# Patient Record
Sex: FEMALE | Race: WHITE
[De-identification: names, ages, dates, MRNs, and addresses within clinical notes are randomized per-mention and may not be internally consistent; named-entity substitution may affect disease eponyms.]

---

## 2017-01-17 ENCOUNTER — RX ONLY (OUTPATIENT)
Age: 82
Setting detail: RX ONLY
End: 2017-01-17

## 2017-09-29 ENCOUNTER — HOSPITAL ENCOUNTER (OUTPATIENT)
Dept: LAB | Facility: MEDICAL CENTER | Age: 82
End: 2017-09-29
Attending: PHYSICIAN ASSISTANT
Payer: MEDICARE

## 2017-09-29 PROCEDURE — 87086 URINE CULTURE/COLONY COUNT: CPT

## 2017-10-02 LAB
BACTERIA UR CULT: NORMAL
SIGNIFICANT IND 70042: NORMAL
SITE SITE: NORMAL
SOURCE SOURCE: NORMAL

## 2017-10-20 ENCOUNTER — HOSPITAL ENCOUNTER (OUTPATIENT)
Dept: LAB | Facility: MEDICAL CENTER | Age: 82
End: 2017-10-20
Attending: PHYSICIAN ASSISTANT
Payer: MEDICARE

## 2017-10-20 PROCEDURE — 87086 URINE CULTURE/COLONY COUNT: CPT

## 2017-10-23 LAB
BACTERIA UR CULT: NORMAL
SIGNIFICANT IND 70042: NORMAL
SITE SITE: NORMAL
SOURCE SOURCE: NORMAL

## 2017-10-31 ENCOUNTER — APPOINTMENT (RX ONLY)
Dept: URBAN - METROPOLITAN AREA CLINIC 20 | Facility: CLINIC | Age: 82
Setting detail: DERMATOLOGY
End: 2017-10-31

## 2017-10-31 DIAGNOSIS — Z86.007 PERSONAL HISTORY OF IN-SITU NEOPLASM OF SKIN: ICD-10-CM

## 2017-10-31 DIAGNOSIS — R21 RASH AND OTHER NONSPECIFIC SKIN ERUPTION: ICD-10-CM

## 2017-10-31 PROBLEM — Z85.828 PERSONAL HISTORY OF OTHER MALIGNANT NEOPLASM OF SKIN: Status: ACTIVE | Noted: 2017-10-31

## 2017-10-31 PROBLEM — L57.0 ACTINIC KERATOSIS: Status: ACTIVE | Noted: 2017-10-31

## 2017-10-31 PROCEDURE — ? COUNSELING

## 2017-10-31 PROCEDURE — 99213 OFFICE O/P EST LOW 20 MIN: CPT

## 2017-10-31 ASSESSMENT — LOCATION ZONE DERM: LOCATION ZONE: TRUNK

## 2017-10-31 ASSESSMENT — LOCATION SIMPLE DESCRIPTION DERM: LOCATION SIMPLE: LOWER BACK

## 2017-10-31 ASSESSMENT — LOCATION DETAILED DESCRIPTION DERM: LOCATION DETAILED: INFERIOR LUMBAR SPINE

## 2017-10-31 NOTE — HPI: FOLLOW-UP
What Is The Condition That You Are Returning For Follow-Up?: basal cell carcinoma
During The Previous Visit, The Patient ....: Had C&D, rechecked last appointment
When Was Your Last Appointment?: 05/16/2017

## 2018-01-10 ENCOUNTER — APPOINTMENT (RX ONLY)
Dept: URBAN - METROPOLITAN AREA CLINIC 4 | Facility: CLINIC | Age: 83
Setting detail: DERMATOLOGY
End: 2018-01-10

## 2018-01-10 DIAGNOSIS — L82.1 OTHER SEBORRHEIC KERATOSIS: ICD-10-CM

## 2018-01-10 PROCEDURE — 99213 OFFICE O/P EST LOW 20 MIN: CPT

## 2018-01-10 PROCEDURE — ? COUNSELING

## 2018-01-10 ASSESSMENT — LOCATION SIMPLE DESCRIPTION DERM: LOCATION SIMPLE: RIGHT CHEEK

## 2018-01-10 ASSESSMENT — LOCATION ZONE DERM: LOCATION ZONE: FACE

## 2018-01-10 ASSESSMENT — LOCATION DETAILED DESCRIPTION DERM: LOCATION DETAILED: RIGHT SUPERIOR CENTRAL MALAR CHEEK

## 2019-04-10 ENCOUNTER — HOSPITAL ENCOUNTER (OUTPATIENT)
Dept: LAB | Facility: MEDICAL CENTER | Age: 84
End: 2019-04-10
Attending: UROLOGY
Payer: MEDICARE

## 2019-04-10 PROCEDURE — 87077 CULTURE AEROBIC IDENTIFY: CPT

## 2019-04-10 PROCEDURE — 87186 SC STD MICRODIL/AGAR DIL: CPT

## 2019-04-10 PROCEDURE — 87086 URINE CULTURE/COLONY COUNT: CPT

## 2019-04-14 LAB
BACTERIA UR CULT: ABNORMAL
BACTERIA UR CULT: ABNORMAL
SIGNIFICANT IND 70042: ABNORMAL
SITE SITE: ABNORMAL
SOURCE SOURCE: ABNORMAL

## 2019-04-27 ENCOUNTER — APPOINTMENT (OUTPATIENT)
Dept: RADIOLOGY | Facility: MEDICAL CENTER | Age: 84
End: 2019-04-27
Attending: EMERGENCY MEDICINE
Payer: MEDICARE

## 2019-04-27 ENCOUNTER — HOSPITAL ENCOUNTER (EMERGENCY)
Facility: MEDICAL CENTER | Age: 84
End: 2019-04-27
Attending: EMERGENCY MEDICINE
Payer: MEDICARE

## 2019-04-27 VITALS
WEIGHT: 110.01 LBS | BODY MASS INDEX: 19.49 KG/M2 | TEMPERATURE: 98.1 F | RESPIRATION RATE: 14 BRPM | OXYGEN SATURATION: 99 % | DIASTOLIC BLOOD PRESSURE: 77 MMHG | SYSTOLIC BLOOD PRESSURE: 138 MMHG | HEART RATE: 78 BPM

## 2019-04-27 DIAGNOSIS — S73.034A ANTERIOR DISLOCATION OF RIGHT HIP, INITIAL ENCOUNTER (HCC): ICD-10-CM

## 2019-04-27 PROCEDURE — 96376 TX/PRO/DX INJ SAME DRUG ADON: CPT

## 2019-04-27 PROCEDURE — 96375 TX/PRO/DX INJ NEW DRUG ADDON: CPT

## 2019-04-27 PROCEDURE — 96374 THER/PROPH/DIAG INJ IV PUSH: CPT

## 2019-04-27 PROCEDURE — 36415 COLL VENOUS BLD VENIPUNCTURE: CPT

## 2019-04-27 PROCEDURE — 700111 HCHG RX REV CODE 636 W/ 250 OVERRIDE (IP): Performed by: EMERGENCY MEDICINE

## 2019-04-27 PROCEDURE — 99152 MOD SED SAME PHYS/QHP 5/>YRS: CPT

## 2019-04-27 PROCEDURE — 27265 TREAT HIP DISLOCATION: CPT

## 2019-04-27 PROCEDURE — 99285 EMERGENCY DEPT VISIT HI MDM: CPT

## 2019-04-27 PROCEDURE — 73502 X-RAY EXAM HIP UNI 2-3 VIEWS: CPT | Mod: RT,76

## 2019-04-27 PROCEDURE — 73502 X-RAY EXAM HIP UNI 2-3 VIEWS: CPT | Mod: RT

## 2019-04-27 RX ORDER — ONDANSETRON 2 MG/ML
4 INJECTION INTRAMUSCULAR; INTRAVENOUS ONCE
Status: COMPLETED | OUTPATIENT
Start: 2019-04-27 | End: 2019-04-27

## 2019-04-27 RX ORDER — PROPOFOL 10 MG/ML
200 INJECTION, EMULSION INTRAVENOUS ONCE
Status: DISCONTINUED | OUTPATIENT
Start: 2019-04-27 | End: 2019-04-27

## 2019-04-27 RX ORDER — MORPHINE SULFATE 4 MG/ML
2 INJECTION, SOLUTION INTRAMUSCULAR; INTRAVENOUS ONCE
Status: COMPLETED | OUTPATIENT
Start: 2019-04-27 | End: 2019-04-27

## 2019-04-27 RX ADMIN — ONDANSETRON 4 MG: 2 INJECTION INTRAMUSCULAR; INTRAVENOUS at 09:37

## 2019-04-27 RX ADMIN — MORPHINE SULFATE 2 MG: 4 INJECTION INTRAVENOUS at 09:37

## 2019-04-27 RX ADMIN — ONDANSETRON 4 MG: 2 INJECTION INTRAMUSCULAR; INTRAVENOUS at 11:50

## 2019-04-27 RX ADMIN — PROPOFOL 40 MG: 10 INJECTION, EMULSION INTRAVENOUS at 11:00

## 2019-04-27 ASSESSMENT — LIFESTYLE VARIABLES: DO YOU DRINK ALCOHOL: NO

## 2019-04-27 NOTE — ED NOTES
Provided discharge instructions, Dc'd IV.  Pt and daughter awaiting transport.  Raina GAFFNEY aware.

## 2019-04-27 NOTE — DISCHARGE INSTRUCTIONS
Hip Dislocation  Introduction  Hip dislocation happens when the “ball” at the top of the thigh bone (femur) moves out of its normal position in the socket of the hip bone (pelvis). This happens because of severe force to the hip or the area that surrounds it. The hip may dislocate in a forward or backward direction. Backward dislocation (posterior dislocation) is most common.  Hip dislocation may involve a broken (fractured) bone. It can also involve damage to nerves, tissues that connect bones together (ligaments), or tissues that connect muscles to bones (tendons). Hip dislocation is an emergency that requires immediate medical treatment.  What are the causes?  This condition is often caused by a severe, direct hit (blow) to a bent knee. The blow forces the femur back against the hip joint. This type of injury is most commonly caused by a car crash. This injury may also be caused by:  · Falls, especially falls from a height.  · Contact sports.  · Industrial accidents.  What increases the risk?  The following factors may make you more likely to dislocate your hip:  · Having an artificial (prosthetic) hip.  · Being overweight or obese.  · Lacking hip strength and flexibility.  · Having a defect of the hip joint that is present at birth (congenital).  · Having an increased risk for falling.  · Participating in contact sports.  What are the signs or symptoms?  Symptoms of this condition may include:  · Severe pain in the hip area. Pain may get worse when you move or when you try to use your hip to support (bear) your weight.  · Inability to move the hip.  · Having the leg on the side of the dislocated hip appearing shorter than the other leg.  · Inward turning of the foot on the side of the dislocated hip.  · Loss of feeling in the lower leg, foot, or ankle.  How is this diagnosed?  This condition is diagnosed based on your medical history and a physical exam. You may have X-rays to confirm the diagnosis. You may be  examined by a health care provider who specializes in bone disorders (orthopedist).  How is this treated?  This condition is treated by moving the ball of your femur back into your hip socket (reduction). Your health care provider may perform a reduction by hand (manual reduction) or surgically.  Your health care provider may perform surgical reduction if:  · You have a fracture.  · You have bone fragments in your joint.  · You have damaged blood vessels or nerves.  · Manual reduction was not successful.  After your reduction, treatment may include:  · Crutches.  · A splint.  · Physical therapy to help strengthen the muscles that hold your hip joint in place.  Follow these instructions at home:  If you have a splint:  · Do not put pressure on any part of the splint until it is fully hardened. This may take several hours.  · Wear the splint as told by your health care provider. Remove it only as told by your health care provider.  · Loosen the splint if your toes tingle, become numb, or turn cold and blue.  · Do not let your splint get wet if it is not waterproof.  ¨ Do not take baths, swim, or use a hot tub until your health care provider approves. Ask your health care provider if you can take showers. You may only be allowed to take sponge baths for bathing.  ¨ If your splint is not waterproof, cover it with a watertight plastic bag when you take a bath or a shower.  · Keep the splint clean.  Managing pain, stiffness, and swelling  · If directed, put ice on the injured area:  ¨ Put ice in a plastic bag.  ¨ Place a towel between your skin and the bag.  ¨ Leave the ice on for 20 minutes, 2-3 times a day.  · Wear compression stockings or wraps as told by your health care provider.  · Move your toes often to avoid stiffness and to lessen swelling.  Driving  · Do not drive or operate heavy machinery while taking prescription pain medicine, or as told by your health care provider.  · Ask your health care provider when it  is safe to drive if you have a splint on your hip.  Activity  · Return to your normal activities as told by your health care provider. Ask your health care provider what activities are safe for you.  · If physical therapy was prescribed, do exercises as told by your health care provider.  Safety  · Do not use your hip to bear weight until your health care provider says that you can. Use crutches or a walker as told by your health care provider.  General instructions  · Do not any use tobacco products, such as cigarettes, chewing tobacco, and e-cigarettes. Tobacco can delay bone healing. If you need help quitting, ask your health care provider.  · Take over-the-counter and prescription medicines only as told by your health care provider.  · Keep all follow-up visits as told by your health care provider. This is important.  How is this prevented?  · If you have difficulty walking or keeping your balance, try using a cane or a walker for stability. If you feel unstable, sit down right away.  · Exercise regularly, as told by your health care provider.  · Warm up and stretch before being active.  · Cool down and stretch after being active.  Contact a health care provider if:  · You have pain that gets worse or does not get better with medicine.  · You have swelling or red skin on your hip area or your leg.  · You cannot move any part of your hip or leg.  · You have a tingling sensation in any part of your hip, leg, or foot.  Get help right away if:  · You feel like your hip has dislocated again.  · You have severe pain in your hip or groin.  · You have numbness or weakness in your leg.  If you have symptoms of a hip dislocation, do not wait to see if the symptoms will go away. Get medical help right away. Call your local emergency services (911 in the [place] Do not drive yourself to the hospital.   This information is not intended to replace advice given to you by your health care provider. Make sure you discuss any  questions you have with your health care provider.  Document Released: 09/12/2002 Document Revised: 05/25/2017 Document Reviewed: 07/19/2016  © 2017 [employer]

## 2019-04-27 NOTE — ED TRIAGE NOTES
BIB EMS for hip pain, unable to bear weight or ambulate.  Family denies fall or trauma.  Internal rotation R leg.  Denies pain, without hematoma.  CMS intact. Hx Bilateral hip replacements and dislocations, advanced dementia. Family denies blood thinners

## 2019-04-27 NOTE — ED NOTES
Pt laying in bed answering questions.  She is provided an additional warm blanket.  Continue to monitor

## 2019-04-27 NOTE — ED PROVIDER NOTES
ED Provider Note    Scribed for Caio Franco M.D. by Chela López. 4/27/2019, 9:08 AM.    Primary care provider: Tristan Roth M.D.  Means of arrival: Walk in  History obtained from: Patient's daughter   History limited by: None     CHIEF COMPLAINT  Hip pain    HPI  Svetlana Encinas is a 89 y.o. female with a past medical history significant for dementia, GERD, and osteoporosis who presents to the Emergency Department for evaluation of hip pain onset ` day ago. Per patient's caregiver, patient was being assisted over to the bathroom and noticed patient had trouble ambulating. Patient's daughter reports patient was unable to bear weight to ambulate complaining of hip pain this morning. Daughter also states that she noticed an obvious deformity to the patient's right leg that is internally rotated. She is unsure when this may have occurred. Patient has sustained hip fracture 6 months ago and underwent multiple hip replacement surgery previously. Daughter states that patient's normally able to ambulate with a walker and some assistance. No reports of a fall.     REVIEW OF SYSTEMS  Pertinent positives include right hip pain, difficulty with ambulation, deformity.   Pertinent negatives include no fall.     See HPI for further details. All other systems are reviewed and are negative.     PAST MEDICAL HISTORY   has a past medical history of Arthritis; GERD (gastroesophageal reflux disease); Heart burn; Indigestion; OSTEOPOROSIS; Pain; Unspecified cataract; and Urinary bladder disorder.    SURGICAL HISTORY   has a past surgical history that includes hernia repair; open reduction; other orthopedic surgery; cholecystectomy; cataract extraction with iol; carpal tunnel release (4/21/2014); athroplasty; and ptosis repair (10/22/2014).    SOCIAL HISTORY  Social History   Substance Use Topics   • Smoking status: Never Smoker   • Smokeless tobacco: Never Used   • Alcohol use No      History   Drug Use No        FAMILY HISTORY  History reviewed. No pertinent family history.    CURRENT MEDICATIONS  Home Medications     Reviewed by Lito Penn R.N. (Registered Nurse) on 04/27/19 at 0859  Med List Status: Not Addressed   Medication Last Dose Status   ALENDRONATE SODIUM PO  Active   ascorbic acid (ASCORBIC ACID) 500 MG TABS  Active   celecoxib (CELEBREX) 200 MG CAPS  Active   Diclofenac Sodium (VOLTAREN) 1 % GEL  Active   docusate sodium (DULCOLAX) 100 MG CAPS  Active   Multiple Vitamins-Minerals (CENTRUM SILVER ADULT 50+ PO)  Active   naproxen (ALEVE) 220 MG tablet  Active   omeprazole (PRILOSEC) 20 MG CPDR  Active   Psyllium (METAMUCIL) 0.52 GM CAPS  Active   VITAMIN D, CHOLECALCIFEROL, PO  Active                ALLERGIES  No Known Allergies    PHYSICAL EXAM  VITAL SIGNS: /77   Pulse 70   Temp 36.7 °C (98.1 °F) (Temporal)   Resp 14   Wt 49.9 kg (110 lb 0.2 oz)   SpO2 96%   BMI 19.49 kg/m²   Vitals reviewed.  Constitutional: Alert in no apparent distress.  HENT: No signs of trauma, Bilateral external ears normal, Nose normal.   Eyes: Pupils are equal and reactive, Conjunctiva normal, Non-icteric.   Neck: Normal range of motion, No tenderness, Supple, No stridor.   Lymphatic: No lymphadenopathy noted.   Cardiovascular: Regular rate and rhythm, no murmurs.   Thorax & Lungs: Normal breath sounds, No respiratory distress, No wheezing, No chest tenderness.   Abdomen: Bowel sounds normal, Soft, No tenderness, No peritoneal signs, No masses, No pulsatile masses.   Skin: Warm, Dry, No erythema, No rash.   Back: No bony tenderness, No CVA tenderness.   Extremities: Intact distal pulses, No edema, No tenderness, No cyanosis  Musculoskeletal: Hip is internally rotated on the right, no erythema, positive tenderness to palpation and pain with range of motion.  Normal 2+ pulses in bilateral lower extremities.   Neurologic: Alert, Normal motor function, Normal sensory function, No focal deficits noted.     DIAGNOSTIC  STUDIES / PROCEDURES      RADIOLOGY  DX-HIP-COMPLETE - UNILATERAL 2+ RIGHT   Final Result      1.  There has been interval reduction of the right hip arthroplasty dislocation.   2.  There is no definite acute displaced fracture.   3.  There is probably subacute or old injury involving the right inferior pubic ramus.      DX-HIP-COMPLETE - UNILATERAL 2+ RIGHT   Final Result      1.  There is a right superior lateral hip dislocation of the right hip arthroplasty.   2.  There is marked osteopenia but there are no displaced acute fractures identified.        The radiologist's interpretation of all radiological studies have been reviewed by me.    Conscious Sedation Procedure Note    Indication: hip dislocation    Consent: I have discussed with the patient and/or the patient representative the indication, alternatives, and the possible risks and/or complications of the planned procedure and the anesthesia methods. The patient and/or patient representative appear to understand and agree to proceed.    Physician Involvement: The attending physician was present and supervising this procedure.    Pre-Sedation Documentation and Exam: I have reviewed the patient's history and review of systems.    Airway Assessment: normal    Prior History of Anesthesia Complications: none    ASA Classification: Class 1 - A normal healthy patient    Sedation/ Anesthesia Plan: intravenous sedation    Medications Used: propofol intravenously    Monitoring and Safety: The patient was placed on a cardiac monitor and vital signs, pulse oximetry and level of consciousness were continuously evaluated throughout the procedure. The patient was closely monitored until recovery from the medications was complete and the patient had returned to baseline status. Respiratory therapy was on standby at all times during the procedure.    (The following sections must be completed)  Post-Sedation Vital Signs: Vital signs were reviewed and were stable after the  procedure (see flow sheet for vitals)            Post-Sedation Exam: Lungs: clear, heart regular rate and rhythm with no murmurs.           Complications: none    Joint Reduction Procedure Note    Indication: Joint dislocation    Consent: The patient and daughter were counseled regarding the procedure, it's indications, risks, potential complications and alternatives and any questions were answered. Consent was obtained.    Procedure: The pre-reduction exam showed distal perfusion & neurologic function to be normal. The patient was placed in the appropriate position. Anesthesia/pain control was obtained using conscious sedation -SEE CONSCIOUS SEDATION NOTE FOR DETAILS. Reduction of the right hip was performed by axial traction. Post reduction films were obtained and revealed satisfactory reduction. A post-reduction exam revealed distal perfusion & neurologic function to be normal. Patient has a walker and did not require additional ambulatory support.    The patient tolerated the procedure well.    Complications: None    COURSE & MEDICAL DECISION MAKING  Nursing notes, VS, PMSFHx reviewed in chart.    Differential diagnoses include but not limited to: Right hip fracture vs. Right hip dislocation      9:08 AM Patient seen and examined at bedside. Ordered for DX right hip to evaluate. Patient will be treated with 4 mg/ml morphine injection and 4 mg Zofran for her symptoms.    10:19 AM Reviewed patient's radiology results which showed dislocation of the right hip.     10:52 AM Patient reevaluated at bedside. She appears to be comfortably resting in bed. Discussed diagnostic results with patient and daughter as above, without evidence for fracture. Also discussed the risks of conscious sedation and joint reduction procedure. Patient's daughter understands and is agreeable.     11:06 AM Conscious sedation and joint reduction procedure performed at this time as outlined above. At least 15 minutes were spent at patient's  bedside at this time. Ordered DX right hip to confirm reduction. Patient was treated with additional 4 mg Zofran. Patient and daughter were advised to follow up with their Orthopedics surgeon, Dr. Parsons, for further evaluation. They understand and are comfortable to discharge home with instructions on supportive care.     DISPOSITION:  Patient will be discharged home in stable condition.    FOLLOW UP:  [facility], Emergency Dept  [address] [id number] [phone]    If symptoms worsen    Brad Garcia M.D.  [address]  [address]  [place] [id number] [phone]      As needed    Matt Parsons M.D.  [address]  [facility] [address]  [phone]      call for follow up    OUTPATIENT MEDICATIONS:  New Prescriptions    No medications on file     FINAL IMPRESSION  1. Anterior dislocation of right hip, initial encounter ([facility])    2.      Conscious sedation 16 minutes face-to-face performed by ERP  3.      Closed reduction of right hip dislocation performed by ERP      Chela HEWITT (Roc), am scribing for, and in the presence of, Caio Franco M.D..  Electronically signed by: Chela Whitt), 4/27/2019  Caio HEWITT M.D. personally performed the services described in this documentation, as scribed by Chela López in my presence, and it is both accurate and complete. C.     The note accurately reflects work and decisions made by me.  Caio Franco  4/27/2019  12:12 PM

## 2019-04-27 NOTE — ED NOTES
XRAY tech to room.  Pt taken to Xray with RN standby.  Pt is on 2L O2 to maintain sats >93%.  PT more awake and answering questions but easily falls back to sleep.

## 2019-04-27 NOTE — ED NOTES
Dr. Villarreal, Medical student, RT and RN in room.  Timeout, consent signed.  MD pushed propofol.  The then manipulated hip and it was back in place without incident.

## 2019-05-07 ENCOUNTER — HOSPITAL ENCOUNTER (EMERGENCY)
Facility: MEDICAL CENTER | Age: 84
End: 2019-05-07
Attending: EMERGENCY MEDICINE
Payer: MEDICARE

## 2019-05-07 ENCOUNTER — APPOINTMENT (OUTPATIENT)
Dept: RADIOLOGY | Facility: MEDICAL CENTER | Age: 84
End: 2019-05-07
Attending: EMERGENCY MEDICINE
Payer: MEDICARE

## 2019-05-07 VITALS
TEMPERATURE: 97.6 F | HEART RATE: 71 BPM | WEIGHT: 105 LBS | RESPIRATION RATE: 16 BRPM | OXYGEN SATURATION: 96 % | HEIGHT: 64 IN | BODY MASS INDEX: 17.93 KG/M2 | DIASTOLIC BLOOD PRESSURE: 71 MMHG | SYSTOLIC BLOOD PRESSURE: 145 MMHG

## 2019-05-07 DIAGNOSIS — N39.0 ACUTE UTI: ICD-10-CM

## 2019-05-07 DIAGNOSIS — S73.004A DISLOCATION OF RIGHT HIP, INITIAL ENCOUNTER (HCC): ICD-10-CM

## 2019-05-07 LAB
APPEARANCE UR: ABNORMAL
BACTERIA #/AREA URNS HPF: ABNORMAL /HPF
BILIRUB UR QL STRIP.AUTO: NEGATIVE
COLOR UR: YELLOW
EPI CELLS #/AREA URNS HPF: ABNORMAL /HPF
GLUCOSE UR STRIP.AUTO-MCNC: NEGATIVE MG/DL
KETONES UR STRIP.AUTO-MCNC: NEGATIVE MG/DL
LEUKOCYTE ESTERASE UR QL STRIP.AUTO: ABNORMAL
MICRO URNS: ABNORMAL
NITRITE UR QL STRIP.AUTO: POSITIVE
PH UR STRIP.AUTO: 7 [PH]
PROT UR QL STRIP: NEGATIVE MG/DL
RBC # URNS HPF: >150 /HPF
RBC UR QL AUTO: ABNORMAL
SP GR UR STRIP.AUTO: 1.01
WBC #/AREA URNS HPF: ABNORMAL /HPF

## 2019-05-07 PROCEDURE — 99285 EMERGENCY DEPT VISIT HI MDM: CPT

## 2019-05-07 PROCEDURE — 87186 SC STD MICRODIL/AGAR DIL: CPT

## 2019-05-07 PROCEDURE — 87086 URINE CULTURE/COLONY COUNT: CPT

## 2019-05-07 PROCEDURE — 81001 URINALYSIS AUTO W/SCOPE: CPT

## 2019-05-07 PROCEDURE — 72170 X-RAY EXAM OF PELVIS: CPT

## 2019-05-07 PROCEDURE — A9270 NON-COVERED ITEM OR SERVICE: HCPCS | Performed by: EMERGENCY MEDICINE

## 2019-05-07 PROCEDURE — 87077 CULTURE AEROBIC IDENTIFY: CPT

## 2019-05-07 PROCEDURE — 51701 INSERT BLADDER CATHETER: CPT

## 2019-05-07 PROCEDURE — 700102 HCHG RX REV CODE 250 W/ 637 OVERRIDE(OP): Performed by: EMERGENCY MEDICINE

## 2019-05-07 RX ORDER — CEFDINIR 300 MG/1
300 CAPSULE ORAL 2 TIMES DAILY
Qty: 20 CAP | Refills: 0 | Status: SHIPPED | OUTPATIENT
Start: 2019-05-07 | End: 2019-06-06

## 2019-05-07 RX ORDER — CEFDINIR 300 MG/1
300 CAPSULE ORAL ONCE
Status: COMPLETED | OUTPATIENT
Start: 2019-05-07 | End: 2019-05-07

## 2019-05-07 RX ADMIN — CEFDINIR 300 MG: 300 CAPSULE ORAL at 15:40

## 2019-05-07 NOTE — ED NOTES
Dc instructions reviewed with pt, son and care giver. Aware of need to f/u with pcp,  antibx at [facility] and take 2x daily, including 1 more today. To return for worsening s/s

## 2019-05-07 NOTE — ED NOTES
"dtr and care giver at bedside, assisting to amb with walker per home adl. Family states \"this is how she is at home\" and are comfortable with pt being d/c. erp aware of same  "

## 2019-05-07 NOTE — ED NOTES
Sons request no blood work or further interventions until their sister (pt daughter) arrives. erp aware of same-blood work NOT drawn pending same

## 2019-05-07 NOTE — DISCHARGE INSTRUCTIONS
Antibiotics as prescribed.  Return to the emergency room for any new medical problems or complaints.  See your doctor this week.

## 2019-05-07 NOTE — ED TRIAGE NOTES
Chief Complaint   Patient presents with   • Hip Injury     Bilateral hip replacements per ROB, today she is here for possible right hip dislocation. This is a chronic problem.   On ERP evaluation this hip was easily relocated. Image to be ordered. Also per MELANIESA, this pt had a ground level fall per caregiver, MELANIESA was summoned by daughter.

## 2019-05-07 NOTE — ED NOTES
Med rec updated and complete  Allergies reviewed  Pt is not sure what medications she is taking.  Called pts daughter (Evelyn) @ [phone] to verify all pts medications.  Pts daughter reports no prescription medications.  Pts daughter reports no antibiotics in the last 30 days.

## 2019-05-07 NOTE — ED NOTES
Female minicath x 2 insufficient sample per lab. Also attempted to stand pt with walker-pt unable to bear weight. erp and sons aware of same

## 2019-05-07 NOTE — ED PROVIDER NOTES
ED Provider Note    CHIEF COMPLAINT  Chief Complaint   Patient presents with   • Hip Injury     Bilateral hip replacements per [facility], today she is here for possible right hip dislocation. This is a chronic problem.       HPI  Svetlana Encinas is a 89 y.o. female who presents to the emergency department with right hip pain.  The patient has a history of recurrent right hip dislocations.  Patient is really unable to say what happened.  Unknown whether she has any falls or trauma she denies this.  No other complaints of pain at this time    REVIEW OF SYSTEMS  See HPI for further details. All other systems are negative.    PAST MEDICAL HISTORY  Past Medical History:   Diagnosis Date   • Arthritis     shoulders, elbow and wrist, hands   • GERD (gastroesophageal reflux disease)    • Heart burn    • Indigestion    • OSTEOPOROSIS    • Pain     hands   • Unspecified cataract     nikita IOL   • Urinary bladder disorder        FAMILY HISTORY  No family history on file.    SOCIAL HISTORY  Social History     Social History   • Marital status:      Spouse name: N/A   • Number of children: N/A   • Years of education: N/A     Social History Main Topics   • Smoking status: Never Smoker   • Smokeless tobacco: Never Used   • Alcohol use No   • Drug use: No   • Sexual activity: No     Other Topics Concern   • Not on file     Social History Narrative   • No narrative on file       SURGICAL HISTORY  Past Surgical History:   Procedure Laterality Date   • PTOSIS REPAIR  10/22/2014    Performed by Froylan Dunbar M.D. at SURGERY SURGICAL [facility] ORS   • CARPAL TUNNEL RELEASE  4/21/2014    Performed by Felix Huffman M.D. at SURGERY SAME [facility] ORS   • ATHROPLASTY     • CATARACT EXTRACTION WITH IOL      nikita eyes   • CHOLECYSTECTOMY     • HERNIA REPAIR     • OPEN REDUCTION     • OTHER ORTHOPEDIC SURGERY      multiple surgeries on hips       CURRENT MEDICATIONS  Home Medications    **Home medications have not yet been reviewed  "for this encounter**         ALLERGIES  No Known Allergies    PHYSICAL EXAM  VITAL SIGNS: /71   Pulse 72   Temp 36.4 °C (97.6 °F) (Temporal)   Resp 16   Ht 1.626 m (5' 4\")   Wt 47.6 kg (105 lb)   SpO2 96%   BMI 18.02 kg/m²      Constitutional: Awake alert nontoxic no acute distress  HENT: Normocephalic, Atraumatic, Bilateral external ears normal, Oropharynx moist, No oral exudates, Nose normal.   Eyes: PERRL, EOMI, Conjunctiva normal, No discharge.   Neck: Normal range of motion, No tenderness, Supple, No stridor.   Lymphatic: No lymphadenopathy noted.   Cardiovascular: Normal heart rate, Normal rhythm, No murmurs, No rubs, No gallops.   Thorax & Lungs: Normal breath sounds, No respiratory distress, No wheezing, no tenderness or signs of trauma.   Abdomen: Bowel sounds normal, Soft, No tenderness, no signs of trauma  Skin: Warm, Dry, No erythema, No rash.   Back: No tenderness, No CVA tenderness.  No abrasions or signs of trauma  Genitalia: No signs of rash.  Musculoskeletal: Extremities are unremarkable except for the right lower extremity which is clearly right hip dislocation she is shortening, and rotation.  Pain with movement.  Neurologic: Alert, at baseline per family.    RADIOLOGY/PROCEDURES  DX-PELVIS-1 OR 2 VIEWS   Final Result      1.  Bilateral hip arthroplasties. There is no evidence of dislocation of the arthroplasty components.      2.  Severe osteopenia.      3.  Old posttraumatic change of the right inferior pubic ramus.            COURSE & MEDICAL DECISION MAKING  Pertinent Labs & Imaging studies reviewed. (See chart for details)  The patient presents emergency department with right hip pain recurrent dislocation.  She is pretty comfortable and I first saw her.    Right hip is dislocated shortened and internally rotated and has pain with flexion of the hip.    She is noted to have significant atrophy that I suspect is a result of this the hip will reduce without sedation.      Insert " procedure note  Hip reduction procedure    Gentle traction applied distally on the foot longitudinally in the hip reduced within seconds.  This is tolerated well there is no pain.  Repeat neurovascular exam is normal      Initially the patient is here with family but not the family who is her power of  nor her caretaker.  They are really not able to provide much history.  Her hip is back and they state normally she walks is not help at home.  1 make sure she can get up and move around.  She does have some significant problems.  They asked me to wait for her daughter who is her power of  to perform any other care so I have done this.    In the interim I have reviewed the patient's chart, patient is a recent positive urine culture.  The family at bedside states that the patient should not be on antibiotics she does not have any symptoms and this is what the plan made by her primary care doctor.      Initially we should get a catheter site specimens were unable to do so.     The patient's caregiver finally showed up as well with the power of  and they are able to him later confirmed that she is at baseline.  They want me to check for her urine again.  And after doing so the request to call see her infectious disease.      I have collected a urinalysis and this will be sent for culture.  The patient's urinalysis is strongly positive.    Per the family's request I spoke with the physician on call for see her infectious disease, this is Dr. Roberts.    Have reviewed the cultures from April 10 and the results that he was restarted on Omnicef.    Family does not want any further work-up and wanted treated at home with oral antibiotic and follow-up with primary care think this is reasonable.  The patient is no signs of sepsis.  Per them she is at baseline but functionally, her mental status is baseline she is eating well she is drinking well and she is admitting at baseline and nothing else is  unusual.    I think she has pretty significant cystitis since this is gone untreated but no signs of sepsis we will treat her with oral Omnicef and close follow-up.    First dose of Omnicef was given here in the emergency department and she will follow-up with primary care and her infectious disease doctor this week.  Family is comfortable the plan she is discharged in good condition.    Brad Garcia M.D.  [id number] [facility] [address]  [facility] [address]  [phone]                FINAL IMPRESSION  1. Dislocation of right hip, initial encounter ([facility])    2. Acute UTI          3.         Electronically signed by: Fernando Garcia, 5/7/2019 10:33 AM

## 2019-05-07 NOTE — ED NOTES
Results noted by erp-per same, await return call from [facility] infectious disease. Update to family. Pt resting with eyes closed

## 2019-05-11 NOTE — ED NOTES
ED Positive Culture Follow-up/Notification Note:    Date: 5/11/19     Patient seen in the ED on 5/7/2019 for hip dislocation s/p fall.   1. Dislocation of right hip, initial encounter ([facility])    2. Acute UTI       Discharge Medication List as of 5/7/2019  3:43 PM      START taking these medications    Details   cefdinir (OMNICEF) 300 MG Cap Take 1 Cap by mouth 2 times a day., Disp-20 Cap, R-0, Normal             Allergies: Ciprofloxacin and Sulfamethoxazole-trimethoprim     Vitals:    05/07/19 1330 05/07/19 1430 05/07/19 1500 05/07/19 1542   BP:       Pulse: 68 68 68 71   Resp:       Temp:       TempSrc:       SpO2: 95% 99% 100% 96%   Weight:       Height:           Final cultures:   Results     Procedure Component Value Units Date/Time    URINE CULTURE(NEW) [[id number]]  (Abnormal)  (Susceptibility) Collected:  05/07/19 1423    Order Status:  Completed Specimen:  Urine Updated:  05/10/19 0923     Significant Indicator POS (POS)     Source UR     Site -     Culture Result - (A)      Klebsiella pneumoniae  >100,000 cfu/mL   (A)    Culture & Susceptibility     KLEBSIELLA PNEUMONIAE     Antibiotic Sensitivity Microscan Unit Status    Ampicillin Resistant >16 mcg/mL Final    Method: MARYBEL    Cefepime Sensitive <=8 mcg/mL Final    Method: MARYBEL    Cefotaxime Sensitive <=2 mcg/mL Final    Method: MARYBEL    Cefotetan Sensitive <=16 mcg/mL Final    Method: MARYBEL    Ceftazidime Sensitive <=1 mcg/mL Final    Method: MARYBEL    Ceftriaxone Sensitive <=8 mcg/mL Final    Method: MARYBEL    Cefuroxime Sensitive <=4 mcg/mL Final    Method: MARYBEL    Cephalothin Sensitive <=8 mcg/mL Final    Method: MARYBEL    Ciprofloxacin Sensitive <=1 mcg/mL Final    Method: MARYBEL    Gentamicin Sensitive <=4 mcg/mL Final    Method: MARYBEL    Levofloxacin Sensitive <=2 mcg/mL Final    Method: MARYBEL    Nitrofurantoin Intermediate 64 mcg/mL Final    Method: MARYBEL    Pip/Tazobactam Sensitive <=16 mcg/mL Final    Method: MARYBEL    Piperacillin Sensitive <=16 mcg/mL Final    Method: MARYBEL     Tigecycline Sensitive <=2 mcg/mL Final    Method: MARYBEL    Tobramycin Sensitive <=4 mcg/mL Final    Method: MARYBEL    Trimeth/Sulfa Sensitive <=2/38 mcg/mL Final    Method: MARYBEL                       URINALYSIS [[id number]]  (Abnormal) Collected:  05/07/19 1423    Order Status:  Completed Updated:  05/07/19 1448     Color Yellow     Character Cloudy (A)     Specific Gravity 1.010     Ph 7.0     Glucose Negative mg/dL      Ketones Negative mg/dL      Protein Negative mg/dL      Bilirubin Negative     Nitrite Positive (A)     Leukocyte Esterase Large (A)     Occult Blood Large (A)     Micro Urine Req Microscopic    URINALYSIS CULTURE, IF INDICATED [[id number]] Collected:  05/07/19 1423    Order Status:  Canceled Specimen:  Urine Updated:  05/07/19 1425    URINALYSIS,CULTURE IF INDICATED [[id number]] Collected:  05/07/19 1216    Order Status:  Canceled Specimen:  Urine Updated:  05/07/19 1224    URINE CULTURE(NEW) [[id number]] Collected:  05/07/19 1216    Order Status:  Canceled Specimen:  Other from Urine, Straight Cath     URINE CULTURE(NEW) [[id number]]     Order Status:  Canceled           Plan:   Mini-cath inserted x2 in order to obtain adequate urine volume.  Culture is positive for Klebsiella pneumoniae - the organism is sensitive to the prescribed cefdinir.  KATIE consulted in the ED.  No further ED staff follow up required with respect to urine culture results. Will defer further management to KATIE.     Mary Pat

## 2019-05-13 ENCOUNTER — APPOINTMENT (OUTPATIENT)
Dept: RADIOLOGY | Facility: MEDICAL CENTER | Age: 84
End: 2019-05-13
Attending: EMERGENCY MEDICINE
Payer: MEDICARE

## 2019-05-13 ENCOUNTER — HOSPITAL ENCOUNTER (EMERGENCY)
Facility: MEDICAL CENTER | Age: 84
End: 2019-05-13
Attending: EMERGENCY MEDICINE
Payer: MEDICARE

## 2019-05-13 VITALS
WEIGHT: 126 LBS | BODY MASS INDEX: 21.51 KG/M2 | OXYGEN SATURATION: 95 % | TEMPERATURE: 97.6 F | HEART RATE: 67 BPM | DIASTOLIC BLOOD PRESSURE: 74 MMHG | HEIGHT: 64 IN | RESPIRATION RATE: 18 BRPM | SYSTOLIC BLOOD PRESSURE: 141 MMHG

## 2019-05-13 DIAGNOSIS — S73.004A CLOSED DISLOCATION OF RIGHT HIP, INITIAL ENCOUNTER (HCC): ICD-10-CM

## 2019-05-13 LAB
ALBUMIN SERPL BCP-MCNC: 3.3 G/DL (ref 3.2–4.9)
ALBUMIN/GLOB SERPL: 1 G/DL
ALP SERPL-CCNC: 77 U/L (ref 30–99)
ALT SERPL-CCNC: 12 U/L (ref 2–50)
ANION GAP SERPL CALC-SCNC: 10 MMOL/L (ref 0–11.9)
APTT PPP: 31.8 SEC (ref 24.7–36)
AST SERPL-CCNC: 21 U/L (ref 12–45)
BASOPHILS # BLD AUTO: 0.4 % (ref 0–1.8)
BASOPHILS # BLD: 0.02 K/UL (ref 0–0.12)
BILIRUB SERPL-MCNC: 0.7 MG/DL (ref 0.1–1.5)
BUN SERPL-MCNC: 15 MG/DL (ref 8–22)
CALCIUM SERPL-MCNC: 8.6 MG/DL (ref 8.4–10.2)
CHLORIDE SERPL-SCNC: 94 MMOL/L (ref 96–112)
CO2 SERPL-SCNC: 22 MMOL/L (ref 20–33)
CREAT SERPL-MCNC: 0.65 MG/DL (ref 0.5–1.4)
EOSINOPHIL # BLD AUTO: 0.05 K/UL (ref 0–0.51)
EOSINOPHIL NFR BLD: 1 % (ref 0–6.9)
ERYTHROCYTE [DISTWIDTH] IN BLOOD BY AUTOMATED COUNT: 52 FL (ref 35.9–50)
GLOBULIN SER CALC-MCNC: 3.4 G/DL (ref 1.9–3.5)
GLUCOSE SERPL-MCNC: 87 MG/DL (ref 65–99)
HCT VFR BLD AUTO: 30.5 % (ref 37–47)
HGB BLD-MCNC: 9.9 G/DL (ref 12–16)
IMM GRANULOCYTES # BLD AUTO: 0.01 K/UL (ref 0–0.11)
IMM GRANULOCYTES NFR BLD AUTO: 0.2 % (ref 0–0.9)
INR PPP: 1.07 (ref 0.87–1.13)
LYMPHOCYTES # BLD AUTO: 1.13 K/UL (ref 1–4.8)
LYMPHOCYTES NFR BLD: 23.5 % (ref 22–41)
MCH RBC QN AUTO: 28 PG (ref 27–33)
MCHC RBC AUTO-ENTMCNC: 32.5 G/DL (ref 33.6–35)
MCV RBC AUTO: 86.4 FL (ref 81.4–97.8)
MONOCYTES # BLD AUTO: 0.47 K/UL (ref 0–0.85)
MONOCYTES NFR BLD AUTO: 9.8 % (ref 0–13.4)
NEUTROPHILS # BLD AUTO: 3.12 K/UL (ref 2–7.15)
NEUTROPHILS NFR BLD: 65.1 % (ref 44–72)
NRBC # BLD AUTO: 0 K/UL
NRBC BLD-RTO: 0 /100 WBC
PLATELET # BLD AUTO: 312 K/UL (ref 164–446)
PMV BLD AUTO: 9.2 FL (ref 9–12.9)
POTASSIUM SERPL-SCNC: 3.9 MMOL/L (ref 3.6–5.5)
PROT SERPL-MCNC: 6.7 G/DL (ref 6–8.2)
PROTHROMBIN TIME: 13.8 SEC (ref 12–14.6)
RBC # BLD AUTO: 3.53 M/UL (ref 4.2–5.4)
SODIUM SERPL-SCNC: 126 MMOL/L (ref 135–145)
WBC # BLD AUTO: 4.8 K/UL (ref 4.8–10.8)

## 2019-05-13 PROCEDURE — 700111 HCHG RX REV CODE 636 W/ 250 OVERRIDE (IP)

## 2019-05-13 PROCEDURE — 73502 X-RAY EXAM HIP UNI 2-3 VIEWS: CPT | Mod: RT

## 2019-05-13 PROCEDURE — 85610 PROTHROMBIN TIME: CPT

## 2019-05-13 PROCEDURE — 96374 THER/PROPH/DIAG INJ IV PUSH: CPT

## 2019-05-13 PROCEDURE — 700105 HCHG RX REV CODE 258: Performed by: EMERGENCY MEDICINE

## 2019-05-13 PROCEDURE — 85730 THROMBOPLASTIN TIME PARTIAL: CPT

## 2019-05-13 PROCEDURE — 99285 EMERGENCY DEPT VISIT HI MDM: CPT

## 2019-05-13 PROCEDURE — 80053 COMPREHEN METABOLIC PANEL: CPT

## 2019-05-13 PROCEDURE — 94760 N-INVAS EAR/PLS OXIMETRY 1: CPT

## 2019-05-13 PROCEDURE — 27265 TREAT HIP DISLOCATION: CPT

## 2019-05-13 PROCEDURE — 85025 COMPLETE CBC W/AUTO DIFF WBC: CPT

## 2019-05-13 RX ORDER — SODIUM CHLORIDE 9 MG/ML
INJECTION, SOLUTION INTRAVENOUS CONTINUOUS
Status: DISCONTINUED | OUTPATIENT
Start: 2019-05-13 | End: 2019-05-13 | Stop reason: HOSPADM

## 2019-05-13 RX ADMIN — SODIUM CHLORIDE: 9 INJECTION, SOLUTION INTRAVENOUS at 13:05

## 2019-05-13 RX ADMIN — PROPOFOL 40 MG: 10 INJECTION, EMULSION INTRAVENOUS at 13:25

## 2019-05-13 ASSESSMENT — PAIN DESCRIPTION - DESCRIPTORS: DESCRIPTORS: ACHING

## 2019-05-13 NOTE — ED NOTES
Pt assessed. R hip shortened and rotated, pt has hx of dislocation. Family at BS. CMS intact. Pt has no pain in bed. Will cont to monitor

## 2019-05-13 NOTE — ED PROVIDER NOTES
"ED Provider Note    CHIEF COMPLAINT  Chief Complaint   Patient presents with   • Dislocation Hip       HPI  Svetlana Encinas is a 89 y.o. female who presents evaluation of hip pain.  Patient has a history of recurrent hip dislocations.  Patient does not recall what happened but has been complaining of right hip pain.  The patient was brought in by EMS from a nursing home for evaluation.  The patient denies: Head pain, neck pain, chest pain, shortness of breath, fever, chills, abdominal pain, vomiting, diarrhea, back pain, other extremity pain.  No other acute symptomatology or complaints.    REVIEW OF SYSTEMS  See HPI for further details. All other systems negative.    PAST MEDICAL HISTORY  Past Medical History:   Diagnosis Date   • Arthritis     shoulders, elbow and wrist, hands   • GERD (gastroesophageal reflux disease)    • Heart burn    • Indigestion    • OSTEOPOROSIS    • Pain     hands   • Unspecified cataract     nikita IOL   • Urinary bladder disorder        FAMILY HISTORY  No family history on file.    SOCIAL HISTORY  Resides locally    SURGICAL HISTORY  Past Surgical History:   Procedure Laterality Date   • PTOSIS REPAIR  10/22/2014    Performed by Froylan Dunbar M.D. at SURGERY SURGICAL [facility] ORS   • CARPAL TUNNEL RELEASE  4/21/2014    Performed by Felix Huffman M.D. at SURGERY SAME DAY [facility] ORS   • ATHROPLASTY     • CATARACT EXTRACTION WITH IOL      nikita eyes   • CHOLECYSTECTOMY     • HERNIA REPAIR     • OPEN REDUCTION     • OTHER ORTHOPEDIC SURGERY      multiple surgeries on hips       CURRENT MEDICATIONS  See nurse's notes    ALLERGIES  Allergies   Allergen Reactions   • Ciprofloxacin Rash     Pts daughter (Evelyn) reports rash all over body.   • Sulfamethoxazole-Trimethoprim Rash     Pts daughter (Evelyn) reports rash all over body.       PHYSICAL EXAM  VITAL SIGNS: /67   Pulse 65   Temp 36.4 °C (97.6 °F)   Resp 20   Ht 1.626 m (5' 4\")   Wt 57.2 kg (126 lb)   BMI 21.63 kg/m²  "   Constitutional: 89-year-old female, slow to answer questions, oriented x1  HENT: Normocephalic, Atraumatic, Nares:Clear, Oropharynx: moist, well hydrated, posterior pharynx:clear   Eyes: PERRL, EOMI, Conjunctiva normal, No discharge.   Neck: Normal range of motion, No tenderness, Supple, No stridor.   Lymphatic: No lymphadenopathy noted.   Cardiovascular: Regular rate and rhythm without mumurs, gallups, rubs   Thorax & Lungs: Normal Equal breath sounds, No respiratory distress, No wheezing, no stridor, no rales. No chest tenderness.   Abdomen: Soft, nontender, nondistended, no organomegaly, positive bowel sounds normal in quality. No guarding or rebound.  Skin: Decreased skin turgor, pink, warm, dry. No rashes, petechiae, purpura. Normal capillary refill.   Back: No tenderness, No CVA tenderness.   Extremities: Intact distal pulses, No edema, No tenderness, No cyanosis,  Vascular: Pulses are 2+, symmetric in the upper and lower extremities.  Musculoskeletal: These arthritic changes throughout identified; no trauma to the upper extremities or left lower extremity; right lower extremity reveals shortening and external rotation; motor, sensory, vascular intact distally;  Neurologic: Weak appearing, oriented x 1,  No gross focal deficits noted.       RADIOLOGY/PROCEDURES  DX-HIP-COMPLETE - UNILATERAL 2+ RIGHT   Final Result         Interval reduction of the right hip prosthesis.      DX-HIP-COMPLETE - UNILATERAL 2+ RIGHT   Final Result         Superior dislocation of the right hip prosthesis.      Diffuse severe osseous demineralization.            COURSE & MEDICAL DECISION MAKING  Pertinent Labs & Imaging studies reviewed. (See chart for details)  1.  Monitor  2.  IV normal saline; IV fluids administered for clinical dehydration; unable to give oral fluids as patient requires n.p.o. Status; reevaluation revealed stable status;    Laboratory studies: CBC shows white count 4.8, 65% neutrophils, 23% lymphocytes, 9%  monocytes, hemoglobin 9.9 hematocrit 30.5; coags within normal; CMP shows sodium 126 chloride 94 otherwise within normal;    Moderate sedation note/procedure note: A pre-procedural physical examination was performed.  The patient was on continuous EKG and pulse oximetry monitoring.  Patient was medicated with propofol 40 mg IV.  Moderate sedation was obtained.  I then performed traction with countertraction was able to reduce the hip dislocation.  Patient was observed and had no adverse effects from the moderate sedation of the procedure.  The patient meets criteria for discharge for moderate sedation.    Discussion: At this time, the patient presents for evaluation of right hip pain.  Patient had hip dislocation which was treated with moderate sedation and closed reduction.  Patient has no deterioration in status.  Patient is stable for discharge.    FINAL IMPRESSION  1. Closed dislocation of right hip, initial encounter ([facility])    2.      Closed reduction of hip dislocation  3.      Moderate sedation       PLAN  1.  Appropriate discharge instructions given  2.  Follow-up with primary care  3.  Follow-up with orthopedic surgery    Electronically signed by: Guy G Gansert, 5/13/2019 11:18 AM

## 2019-05-13 NOTE — ED NOTES
Pt and pts son are not sure what medications she is taking.  Called Evelyn (Daughter) @ [phone], left message to call back to verify medications.

## 2019-05-13 NOTE — ED NOTES
Pt incontinent of urine, pt cleaned and placed in fresh depends, Pt discharged to son and caregiver, reviewed all discharge instructions including follow up, pt verbalizes understanding, and denies questions.   Escorted to lobby. No belongings left in room.

## 2019-05-13 NOTE — FLOWSHEET NOTE
05/13/19 4565   Events/Summary/Plan   Events/Summary/Plan Conscious sedation stand by.  Post procedure.   Chest Exam   Pulse 63   Heart Rate (Monitored) 62   Oximetry   #Pulse Oximetry (Single Determination) Yes   Oxygen   Pulse Oximetry 99 %   O2 (LPM) 1.5   O2 Daily Delivery Respiratory  Silicone Nasal Cannula

## 2019-05-13 NOTE — ED NOTES
1323: Consent on chart, RT at BS, pt on all monitors for sedation  1325: ERP at BS, sedation start. Time out performed  1325: 40 mg Propofol given IV push  1326: Pt asleep, R hip reduced, pt tolerated well  1328: End sedation, pt remains asleep, hip no longer shortened.     1335: Family at BS  1340: Pt opening eyes, remains drowsy. Pt on room air

## 2019-06-02 ENCOUNTER — APPOINTMENT (OUTPATIENT)
Dept: RADIOLOGY | Facility: MEDICAL CENTER | Age: 84
End: 2019-06-02
Attending: ORTHOPAEDIC SURGERY
Payer: MEDICARE

## 2019-06-02 ENCOUNTER — ANESTHESIA (OUTPATIENT)
Dept: SURGERY | Facility: MEDICAL CENTER | Age: 84
End: 2019-06-02
Payer: MEDICARE

## 2019-06-02 ENCOUNTER — ANESTHESIA EVENT (OUTPATIENT)
Dept: SURGERY | Facility: MEDICAL CENTER | Age: 84
End: 2019-06-02
Payer: MEDICARE

## 2019-06-02 ENCOUNTER — HOSPITAL ENCOUNTER (OUTPATIENT)
Facility: MEDICAL CENTER | Age: 84
End: 2019-06-02
Attending: ORTHOPAEDIC SURGERY | Admitting: ORTHOPAEDIC SURGERY
Payer: MEDICARE

## 2019-06-02 VITALS
DIASTOLIC BLOOD PRESSURE: 71 MMHG | BODY MASS INDEX: 19.4 KG/M2 | RESPIRATION RATE: 18 BRPM | TEMPERATURE: 99.2 F | OXYGEN SATURATION: 96 % | SYSTOLIC BLOOD PRESSURE: 139 MMHG | WEIGHT: 102.73 LBS | HEART RATE: 89 BPM | HEIGHT: 61 IN

## 2019-06-02 PROCEDURE — 73501 X-RAY EXAM HIP UNI 1 VIEW: CPT | Mod: RT

## 2019-06-02 PROCEDURE — 160047 HCHG PACU  - EA ADDL 30 MINS PHASE II: Performed by: ORTHOPAEDIC SURGERY

## 2019-06-02 PROCEDURE — 160048 HCHG OR STATISTICAL LEVEL 1-5: Performed by: ORTHOPAEDIC SURGERY

## 2019-06-02 PROCEDURE — 160026 HCHG SURGERY MINUTES - 1ST 30 MINS LEVEL 1: Performed by: ORTHOPAEDIC SURGERY

## 2019-06-02 PROCEDURE — 700101 HCHG RX REV CODE 250: Performed by: ANESTHESIOLOGY

## 2019-06-02 PROCEDURE — 700105 HCHG RX REV CODE 258: Performed by: ANESTHESIOLOGY

## 2019-06-02 PROCEDURE — 160009 HCHG ANES TIME/MIN: Performed by: ORTHOPAEDIC SURGERY

## 2019-06-02 PROCEDURE — 700111 HCHG RX REV CODE 636 W/ 250 OVERRIDE (IP): Performed by: ANESTHESIOLOGY

## 2019-06-02 PROCEDURE — 160035 HCHG PACU - 1ST 60 MINS PHASE I: Performed by: ORTHOPAEDIC SURGERY

## 2019-06-02 PROCEDURE — 160036 HCHG PACU - EA ADDL 30 MINS PHASE I: Performed by: ORTHOPAEDIC SURGERY

## 2019-06-02 PROCEDURE — 160025 RECOVERY II MINUTES (STATS): Performed by: ORTHOPAEDIC SURGERY

## 2019-06-02 PROCEDURE — 160002 HCHG RECOVERY MINUTES (STAT): Performed by: ORTHOPAEDIC SURGERY

## 2019-06-02 PROCEDURE — 160046 HCHG PACU - 1ST 60 MINS PHASE II: Performed by: ORTHOPAEDIC SURGERY

## 2019-06-02 PROCEDURE — 160037 HCHG SURGERY MINUTES - EA ADDL 1 MIN LEVEL 1: Performed by: ORTHOPAEDIC SURGERY

## 2019-06-02 RX ORDER — ONDANSETRON 2 MG/ML
INJECTION INTRAMUSCULAR; INTRAVENOUS PRN
Status: DISCONTINUED | OUTPATIENT
Start: 2019-06-02 | End: 2019-06-02 | Stop reason: SURG

## 2019-06-02 RX ORDER — HYDROMORPHONE HYDROCHLORIDE 1 MG/ML
0.1 INJECTION, SOLUTION INTRAMUSCULAR; INTRAVENOUS; SUBCUTANEOUS
Status: DISCONTINUED | OUTPATIENT
Start: 2019-06-02 | End: 2019-06-02 | Stop reason: HOSPADM

## 2019-06-02 RX ORDER — HALOPERIDOL 5 MG/ML
1 INJECTION INTRAMUSCULAR
Status: DISCONTINUED | OUTPATIENT
Start: 2019-06-02 | End: 2019-06-02 | Stop reason: HOSPADM

## 2019-06-02 RX ORDER — HYDROMORPHONE HYDROCHLORIDE 1 MG/ML
0.4 INJECTION, SOLUTION INTRAMUSCULAR; INTRAVENOUS; SUBCUTANEOUS
Status: DISCONTINUED | OUTPATIENT
Start: 2019-06-02 | End: 2019-06-02 | Stop reason: HOSPADM

## 2019-06-02 RX ORDER — LABETALOL HYDROCHLORIDE 5 MG/ML
5 INJECTION, SOLUTION INTRAVENOUS
Status: DISCONTINUED | OUTPATIENT
Start: 2019-06-02 | End: 2019-06-02 | Stop reason: HOSPADM

## 2019-06-02 RX ORDER — MIDAZOLAM HYDROCHLORIDE 1 MG/ML
1 INJECTION INTRAMUSCULAR; INTRAVENOUS
Status: DISCONTINUED | OUTPATIENT
Start: 2019-06-02 | End: 2019-06-02 | Stop reason: HOSPADM

## 2019-06-02 RX ORDER — SODIUM CHLORIDE, SODIUM LACTATE, POTASSIUM CHLORIDE, CALCIUM CHLORIDE 600; 310; 30; 20 MG/100ML; MG/100ML; MG/100ML; MG/100ML
INJECTION, SOLUTION INTRAVENOUS
Status: DISCONTINUED | OUTPATIENT
Start: 2019-06-02 | End: 2019-06-02 | Stop reason: SURG

## 2019-06-02 RX ORDER — MEPERIDINE HYDROCHLORIDE 25 MG/ML
12.5 INJECTION INTRAMUSCULAR; INTRAVENOUS; SUBCUTANEOUS
Status: DISCONTINUED | OUTPATIENT
Start: 2019-06-02 | End: 2019-06-02 | Stop reason: HOSPADM

## 2019-06-02 RX ORDER — HYDRALAZINE HYDROCHLORIDE 20 MG/ML
5 INJECTION INTRAMUSCULAR; INTRAVENOUS
Status: DISCONTINUED | OUTPATIENT
Start: 2019-06-02 | End: 2019-06-02 | Stop reason: HOSPADM

## 2019-06-02 RX ORDER — HYDROMORPHONE HYDROCHLORIDE 1 MG/ML
0.2 INJECTION, SOLUTION INTRAMUSCULAR; INTRAVENOUS; SUBCUTANEOUS
Status: DISCONTINUED | OUTPATIENT
Start: 2019-06-02 | End: 2019-06-02 | Stop reason: HOSPADM

## 2019-06-02 RX ORDER — SODIUM CHLORIDE, SODIUM LACTATE, POTASSIUM CHLORIDE, CALCIUM CHLORIDE 600; 310; 30; 20 MG/100ML; MG/100ML; MG/100ML; MG/100ML
INJECTION, SOLUTION INTRAVENOUS CONTINUOUS
Status: DISCONTINUED | OUTPATIENT
Start: 2019-06-02 | End: 2019-06-02 | Stop reason: HOSPADM

## 2019-06-02 RX ORDER — ONDANSETRON 2 MG/ML
4 INJECTION INTRAMUSCULAR; INTRAVENOUS
Status: DISCONTINUED | OUTPATIENT
Start: 2019-06-02 | End: 2019-06-02 | Stop reason: HOSPADM

## 2019-06-02 RX ORDER — TRAMADOL HYDROCHLORIDE 50 MG/1
50 TABLET ORAL EVERY 6 HOURS PRN
COMMUNITY

## 2019-06-02 RX ADMIN — SODIUM CHLORIDE, POTASSIUM CHLORIDE, SODIUM LACTATE AND CALCIUM CHLORIDE: 600; 310; 30; 20 INJECTION, SOLUTION INTRAVENOUS at 10:29

## 2019-06-02 RX ADMIN — ROCURONIUM BROMIDE 25 MG: 10 INJECTION, SOLUTION INTRAVENOUS at 10:32

## 2019-06-02 RX ADMIN — ONDANSETRON 4 MG: 2 INJECTION INTRAMUSCULAR; INTRAVENOUS at 10:49

## 2019-06-02 RX ADMIN — FENTANYL CITRATE 25 MCG: 50 INJECTION, SOLUTION INTRAMUSCULAR; INTRAVENOUS at 10:32

## 2019-06-02 RX ADMIN — SUGAMMADEX 200 MG: 100 INJECTION, SOLUTION INTRAVENOUS at 10:49

## 2019-06-02 RX ADMIN — EPHEDRINE SULFATE 10 MG: 50 INJECTION INTRAMUSCULAR; INTRAVENOUS; SUBCUTANEOUS at 10:34

## 2019-06-02 RX ADMIN — PROPOFOL 70 MG: 10 INJECTION, EMULSION INTRAVENOUS at 10:32

## 2019-06-02 RX ADMIN — LIDOCAINE HYDROCHLORIDE 60 MG: 20 INJECTION, SOLUTION INTRAVENOUS at 10:32

## 2019-06-02 ASSESSMENT — PAIN SCALES - WONG BAKER
WONGBAKER_NUMERICALRESPONSE: DOESN'T HURT AT ALL
WONGBAKER_NUMERICALRESPONSE: DOESN'T HURT AT ALL

## 2019-06-02 ASSESSMENT — PAIN SCALES - GENERAL: PAIN_LEVEL: 0

## 2019-06-02 NOTE — OR NURSING
Pt resting in bed, denies pain, waiting for daughter to arrive, O 2 sats in 80's on room air, pt now on O 2 at 1L per NC, pt encouragaed to cough and deep breath, O 2 sats increase on room air with deep breaths and coughing.

## 2019-06-02 NOTE — OR NURSING
Pt arrived in Phase 2, awake, alert, denies pain, SOB, VS stable, O 2 sats vary from 88-93% on RA, pt encouraged to cough and deep breathe, right hip/waist brace in place, personal belongings at bedside, waiting for family to arrive.

## 2019-06-02 NOTE — OP REPORT
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    OPERATION PERFORMED:  1.  Closed reduction.  2.  Application of abduction orthosis.    SURGEON:  Matt Parsons MD    ASSISTANT:  None.    INDICATIONS FOR THE OPERATION:  A 89-year-old patient with chronic instability   of the right hip, being brought to surgery for an attempt at closed   management with closed reduction and bracing.  The family and the patient have   all decided they would prefer an attempt at closed/conservative management   prior to consideration of consent for revisional arthroplasty.    CONSENT:  Family and the patient were talked to today.  We reviewed the risks,   benefits and alternatives and really very few risks involved in this   procedure.    COMPLICATIONS:  None.    ANESTHESIA:  General.    DESCRIPTION OF OPERATION:  The patient was brought to the operating room,   awake, alert, placed on the right OSI table in supine position.  The patient   was given a general anesthetic.  The hip was easily reduced with distraction   and rotation.  The most stable ranges of motion were from complete extension   to 45 degrees of flexion in the abducted position of about 30 degrees.  As   that was the range, we then assembled our abduction brace.  The    and technician then joined the operating room team.  We constructed the brace   with range of motion as mentioned above.  There were no complications.  The   patient was taken to recovery room in stable condition.  We documented the hip   reduction with an abduction brace in place without complication.       ____________________________________     MD LISSETT GREEN / SHERYL    DD:  06/02/2019 11:03:14  DT:  06/02/2019 11:09:07    D#:  [id number]  Job#:  [id number]

## 2019-06-02 NOTE — DISCHARGE INSTRUCTIONS
ACTIVITY: Rest and take it easy for the first 24 hours.  A responsible adult is recommended to remain with you during that time.  It is normal to feel sleepy.  We encourage you to not do anything that requires balance, judgment or coordination.    MILD FLU-LIKE SYMPTOMS ARE NORMAL. YOU MAY EXPERIENCE GENERALIZED MUSCLE ACHES, THROAT IRRITATION, HEADACHE AND/OR SOME NAUSEA.    FOR 24 HOURS DO NOT:  Drive, operate machinery or run household appliances.  Drink beer or alcoholic beverages.   Make important decisions or sign legal documents.    SPECIAL INSTRUCTIONS:   Closed Reduction for Hip Dislocation  Introduction  A closed reduction is a procedure to properly align bones that have moved out of place. A hip dislocation occurs when the head of your thigh bone (femur) slips out of its normal position in the hip socket. An artificial hip bone may be more likely to dislocate than a normal hip bone because an artificial hip does not have all the structures that normally hold the joint in place.  A closed reduction is not a surgery. It is done without cutting your skin open. During a closed reduction, a health care provider uses pressure and rotation to put bones back into place.  Tell a health care provider about:  · Any allergies you have.  · All medicines you are taking, including vitamins, herbs, eye drops, creams, and over-the-counter medicines.  · Any problems you or family members have had with anesthetic medicines.  · Any blood disorders you have.  · Any surgeries you have had.  · Any medical conditions you have.  What are the risks?  Generally, this is a safe procedure. However, problems can occur and include:  · Allergic reaction to anesthetics.  · Breaks in surrounding bones.  · Damage to surrounding tissues and nerves.  · A blood clot.  · An unsuccessful procedure.  What happens before the procedure?  Do not eat or drink anything after midnight on the night before the procedure or as directed by your health  care provider.  What happens during the procedure?  · You may be given medicine to make you go to sleep (general anesthetic) or to keep you relaxed (sedative) during the procedure.  · You will be positioned on your back or stomach on a table.  · Your health care provider will rotate your femur into its original position and apply pressure to pop it back into the hip socket.  · Your health care provider will move your hip joint around to make sure it is in the correct position.  What happens after the procedure?  · Your blood pressure, heart rate, breathing rate, and blood oxygen level will be monitored often until the medicines you were given have worn off.  · You may have a pillow put between your legs to keep them stable and comfortable.  · You will have imaging studies to make sure:  ¨ The femur is securely in the hip socket.  ¨ There are no breaks or pieces of bone in the joint.  · You will get medicine for pain.  This information is not intended to replace advice given to you by your health care provider. Make sure you discuss any questions you have with your health care provider.    DIET: To avoid nausea, slowly advance diet as tolerated, avoiding spicy or greasy foods for the first day.  Add more substantial food to your diet according to your physician's instructions. INCREASE FLUIDS AND FIBER TO AVOID CONSTIPATION.    SURGICAL DRESSING/BATHING: N/A    FOLLOW-UP APPOINTMENT:  A follow-up appointment should be arranged with your doctor; call to schedule.    You should CALL YOUR PHYSICIAN if you develop:  Fever greater than 101 degrees F.  Pain not relieved by medication, or persistent nausea or vomiting.  Excessive bleeding (blood soaking through dressing) or unexpected drainage from the wound.  Extreme redness or swelling around the incision site, drainage of pus or foul smelling drainage.  Inability to urinate or empty your bladder within 8 hours.  Problems with breathing or chest pain.    You should call  [phone] if you develop problems with breathing or chest pain.  If you are unable to contact your doctor or surgical center, you should go to the nearest emergency room or urgent care center.    Physician's telephone #: Dr. Parsons [phone]    If any questions arise, call your doctor.  If your doctor is not available, please feel free to call the Surgical Center at [phone].  The Center is open Monday through Friday from 7AM to 7PM.  You can also call the HEALTH HOTLINE open 24 hours/day, 7 days/week and speak to a nurse at [phone], or toll free at [phone].    A registered nurse may call you a few days after your surgery to see how you are doing after your procedure.    MEDICATIONS: Resume taking daily medication.  Take prescribed pain medication with food.  If no medication is prescribed, you may take non-aspirin pain medication if needed.  PAIN MEDICATION CAN BE VERY CONSTIPATING.  Take a stool softener or laxative such as senokot, pericolace, or milk of magnesia if needed.    Prescription given for none.  Last pain medication given at none.    If your physician has prescribed pain medication that includes Acetaminophen (Tylenol), do not take additional Acetaminophen (Tylenol) while taking the prescribed medication.    Depression / Suicide Risk    As you are discharged from this [facility] facility, it is important to learn how to keep safe from harming yourself.    Recognize the warning signs:  · Abrupt changes in personality, positive or negative- including increase in energy   · Giving away possessions  · Change in eating patterns- significant weight changes-  positive or negative  · Change in sleeping patterns- unable to sleep or sleeping all the time   · Unwillingness or inability to communicate  · Depression  · Unusual sadness, discouragement and loneliness  · Talk of wanting to die  · Neglect of personal appearance   · Rebelliousness- reckless behavior  · Withdrawal from people/activities  they love  · Confusion- inability to concentrate     If you or a loved one observes any of these behaviors or has concerns about self-harm, here's what you can do:  · Talk about it- your feelings and reasons for harming yourself  · Remove any means that you might use to hurt yourself (examples: pills, rope, extension cords, firearm)  · Get professional help from the community (Mental Health, Substance Abuse, psychological counseling)  · Do not be alone:Call your Safe Contact- someone whom you trust who will be there for you.  · Call your local CRISIS HOTLINE 849-8333 or 898-933-0095  · Call your local Children's Mobile Crisis Response Team [place] (203) 530-9282 or www.[place]  · Call the toll free National Suicide Prevention Hotlines   · National Suicide Prevention Lifeline 473-223-XQDF (0357)  · National Hope Line Network 800-SUICIDE (791-9027)

## 2019-06-02 NOTE — ANESTHESIA POSTPROCEDURE EVALUATION
Patient: Svetlana Encinas    Procedure Summary     Date:  06/02/19 Room / Location:  [place] [address] / SURGERY [facility]    Anesthesia Start:  1029 Anesthesia Stop:  1108    Procedure:  CLOSED REDUCTION- HIP AND BRACED APPLICATION (Right Hip) Diagnosis:  (PAIN IN RIGHT HIP, RIGHT HIP DISLOCATION)    Surgeon:  Matt Parsons M.D. Responsible Provider:  Marck Blue D.O.    Anesthesia Type:  general ASA Status:  2          Final Anesthesia Type: general  Last vitals  BP   Blood Pressure : 138/71    Temp   37.3 °C (99.2 °F)    Pulse   Pulse: 78, Heart Rate (Monitored): 78   Resp   16    SpO2   90 %      Anesthesia Post Evaluation    Patient location during evaluation: PACU  Patient participation: complete - patient participated  Level of consciousness: awake and alert  Pain score: 0    Airway patency: patent  Anesthetic complications: no  Cardiovascular status: hemodynamically stable  Respiratory status: acceptable  Hydration status: euvolemic    PONV: none           Nurse Pain Score: 0 (NPRS)

## 2019-06-02 NOTE — OR NURSING
Daughter here, discharge instructions reviewed with daughter, she verbalized understanding of instructions, Pt discharged via wheelchair to home with daughter and son.

## 2019-06-02 NOTE — OR SURGEON
Immediate Post OP Note    PreOp Diagnosis: dislocated ANA    PostOp Diagnosis: same    Procedure(s):  CLOSED REDUCTION- HIP AND BRACED APPLICATION - Wound Class: Clean    Surgeon(s):  Matt Parsons M.D. surgeon    Anesthesiologist/Type of Anesthesia:  Anesthesiologist: Marck Blue D.O./General    Surgical Staff:  Circulator: Priyanka Moore R.N.  Scrub Person: Lito Ospina  Radiology Technologist: Jaime Alvarado    Specimens removed if any:  * No specimens in log *    Estimated Blood Loss: none    Findings: as described    Complications: none        6/2/2019 11:05 AM Matt Parsons M.D.

## 2019-06-02 NOTE — ANESTHESIA TIME REPORT
Anesthesia Start and Stop Event Times     Date Time Event    6/2/2019 1029 Anesthesia Start     1108 Anesthesia Stop        Responsible Staff  06/02/19    Name Role Begin End    Marck Blue D.O. Anesth 1029 1108        Preop Diagnosis (Free Text):  Pre-op Diagnosis     PAIN IN RIGHT HIP, RIGHT HIP DISLOCATION        Preop Diagnosis (Codes):  Diagnosis Information     Diagnosis Code(s):         Post op Diagnosis  Anterior dislocation of right hip (HCC)      Premium Reason  E. Weekend    Comments:

## 2019-06-02 NOTE — ANESTHESIA QCDR
2019 [facility] Clinical Data Registry (for Quality Improvement)     Postoperative nausea/vomiting risk protocol (Adult = 18 yrs and Pediatric 3-17 yrs)- (430 and 463)  General inhalation anesthetic (NOT TIVA) with PONV risk factors: No  Provision of anti-emetic therapy with at least 2 different classes of agents: N/A  Patient DID NOT receive anti-emetic therapy and reason is documented in Medical Record: N/A    Multimodal Pain Management- (AQI59)  Patient undergoing Elective Surgery (i.e. Outpatient, or ASC, or Prescheduled Surgery prior to Hospital Admission): Yes  Use of Multimodal Pain Management, two or more drugs and/or interventions, NOT including systemic opioids: Yes   Exception: Documented allergy to multiple classes of analgesics:  N/A    PACU assessment of acute postoperative pain prior to Anesthesia Care End- Applies to Patients Age = 18- (ABG7)  Initial PACU pain score is which of the following: < 7/10  Patient unable to report pain score: N/A    Post-anesthetic transfer of care checklist/protocol to PACU/ICU- (426 and 427)  Upon conclusion of case, patient transferred to which of the following locations: PACU/Non-ICU  Use of transfer checklist/protocol: Yes  Exclusion: Service Performed in Patient Hospital Room (and thus did not require transfer): N/A    PACU Reintubation- (AQI31)  General anesthesia requiring endotracheal intubation (ETT) along with subsequent extubation in OR or PACU: No  Required reintubation in the PACU: N/A  Extubation was a planned trial documented in the medical record prior to removal of the original airway device: N/A    Unplanned admission to ICU related to anesthesia service up through end of PACU care- (MD51)  Unplanned admission to ICU (not initially anticipated at anesthesia start time): No

## 2019-06-02 NOTE — ANESTHESIA PREPROCEDURE EVALUATION
Relevant Problems   (+) GERD (gastroesophageal reflux disease)   (+) HTN (hypertension)       Physical Exam    Airway   Mallampati: II  TM distance: >3 FB  Neck ROM: full       Cardiovascular - normal exam  Rhythm: regular  Rate: normal  (-) murmur     Dental - normal exam         Pulmonary - normal exam  Breath sounds clear to auscultation     Abdominal    Neurological - normal exam                 Anesthesia Plan    ASA 2       Plan - general       Airway plan will be ETT        Induction: intravenous    Postoperative Plan: Postoperative administration of opioids is intended.    Pertinent diagnostic labs and testing reviewed    Informed Consent:    Anesthetic plan and risks discussed with patient.    Use of blood products discussed with: patient whom consented to blood products.

## 2019-06-02 NOTE — PROGRESS NOTES
Orthopaedics  Patient seen and examined  Few risks, non open procedure  For closed reduction and application of abduction brace  Family at bedside  Issa

## 2019-06-06 ENCOUNTER — HOSPITAL ENCOUNTER (INPATIENT)
Facility: MEDICAL CENTER | Age: 84
LOS: 1 days | DRG: 690 | End: 2019-06-09
Attending: EMERGENCY MEDICINE | Admitting: INTERNAL MEDICINE
Payer: MEDICARE

## 2019-06-06 ENCOUNTER — APPOINTMENT (OUTPATIENT)
Dept: RADIOLOGY | Facility: MEDICAL CENTER | Age: 84
DRG: 690 | End: 2019-06-06
Attending: EMERGENCY MEDICINE
Payer: MEDICARE

## 2019-06-06 ENCOUNTER — HOME HEALTH ADMISSION (OUTPATIENT)
Dept: HOME HEALTH SERVICES | Facility: HOME HEALTHCARE | Age: 84
End: 2019-06-06
Payer: MEDICARE

## 2019-06-06 DIAGNOSIS — N39.0 URINARY TRACT INFECTION WITHOUT HEMATURIA, SITE UNSPECIFIED: ICD-10-CM

## 2019-06-06 PROBLEM — F03.90 DEMENTIA (HCC): Status: ACTIVE | Noted: 2019-06-06

## 2019-06-06 PROBLEM — R19.7 DIARRHEA: Status: ACTIVE | Noted: 2019-06-06

## 2019-06-06 PROBLEM — S73.034A: Status: ACTIVE | Noted: 2019-06-06

## 2019-06-06 PROBLEM — E87.6 HYPOKALEMIA: Status: ACTIVE | Noted: 2019-06-06

## 2019-06-06 LAB
ANION GAP SERPL CALC-SCNC: 14 MMOL/L (ref 0–11.9)
APPEARANCE UR: ABNORMAL
BACTERIA #/AREA URNS HPF: ABNORMAL /HPF
BASOPHILS # BLD AUTO: 0.3 % (ref 0–1.8)
BASOPHILS # BLD: 0.02 K/UL (ref 0–0.12)
BILIRUB UR QL STRIP.AUTO: NEGATIVE
BUN SERPL-MCNC: 12 MG/DL (ref 8–22)
CALCIUM SERPL-MCNC: 9 MG/DL (ref 8.4–10.2)
CHLORIDE SERPL-SCNC: 97 MMOL/L (ref 96–112)
CO2 SERPL-SCNC: 22 MMOL/L (ref 20–33)
COLOR UR: YELLOW
CREAT SERPL-MCNC: 0.72 MG/DL (ref 0.5–1.4)
EOSINOPHIL # BLD AUTO: 0.01 K/UL (ref 0–0.51)
EOSINOPHIL NFR BLD: 0.2 % (ref 0–6.9)
EPI CELLS #/AREA URNS HPF: NEGATIVE /HPF
ERYTHROCYTE [DISTWIDTH] IN BLOOD BY AUTOMATED COUNT: 52.2 FL (ref 35.9–50)
GLUCOSE SERPL-MCNC: 168 MG/DL (ref 65–99)
GLUCOSE UR STRIP.AUTO-MCNC: NEGATIVE MG/DL
HCT VFR BLD AUTO: 33.6 % (ref 37–47)
HGB BLD-MCNC: 10.8 G/DL (ref 12–16)
IMM GRANULOCYTES # BLD AUTO: 0.02 K/UL (ref 0–0.11)
IMM GRANULOCYTES NFR BLD AUTO: 0.3 % (ref 0–0.9)
KETONES UR STRIP.AUTO-MCNC: ABNORMAL MG/DL
LACTATE BLD-SCNC: 1.5 MMOL/L (ref 0.5–2)
LEUKOCYTE ESTERASE UR QL STRIP.AUTO: ABNORMAL
LYMPHOCYTES # BLD AUTO: 0.59 K/UL (ref 1–4.8)
LYMPHOCYTES NFR BLD: 9.8 % (ref 22–41)
MCH RBC QN AUTO: 28 PG (ref 27–33)
MCHC RBC AUTO-ENTMCNC: 32.1 G/DL (ref 33.6–35)
MCV RBC AUTO: 87 FL (ref 81.4–97.8)
MICRO URNS: ABNORMAL
MONOCYTES # BLD AUTO: 0.47 K/UL (ref 0–0.85)
MONOCYTES NFR BLD AUTO: 7.8 % (ref 0–13.4)
NEUTROPHILS # BLD AUTO: 4.93 K/UL (ref 2–7.15)
NEUTROPHILS NFR BLD: 81.6 % (ref 44–72)
NITRITE UR QL STRIP.AUTO: NEGATIVE
NRBC # BLD AUTO: 0 K/UL
NRBC BLD-RTO: 0 /100 WBC
PH UR STRIP.AUTO: 6.5 [PH]
PLATELET # BLD AUTO: 313 K/UL (ref 164–446)
PMV BLD AUTO: 10.1 FL (ref 9–12.9)
POTASSIUM SERPL-SCNC: 3.4 MMOL/L (ref 3.6–5.5)
PROT UR QL STRIP: NEGATIVE MG/DL
RBC # BLD AUTO: 3.86 M/UL (ref 4.2–5.4)
RBC # URNS HPF: ABNORMAL /HPF
RBC UR QL AUTO: ABNORMAL
SODIUM SERPL-SCNC: 133 MMOL/L (ref 135–145)
SP GR UR STRIP.AUTO: 1.01
WBC # BLD AUTO: 6 K/UL (ref 4.8–10.8)
WBC #/AREA URNS HPF: ABNORMAL /HPF

## 2019-06-06 PROCEDURE — 99285 EMERGENCY DEPT VISIT HI MDM: CPT

## 2019-06-06 PROCEDURE — 87040 BLOOD CULTURE FOR BACTERIA: CPT

## 2019-06-06 PROCEDURE — 87186 SC STD MICRODIL/AGAR DIL: CPT

## 2019-06-06 PROCEDURE — 83605 ASSAY OF LACTIC ACID: CPT

## 2019-06-06 PROCEDURE — 71045 X-RAY EXAM CHEST 1 VIEW: CPT

## 2019-06-06 PROCEDURE — A9270 NON-COVERED ITEM OR SERVICE: HCPCS | Performed by: HOSPITALIST

## 2019-06-06 PROCEDURE — 81001 URINALYSIS AUTO W/SCOPE: CPT

## 2019-06-06 PROCEDURE — 96365 THER/PROPH/DIAG IV INF INIT: CPT

## 2019-06-06 PROCEDURE — 700105 HCHG RX REV CODE 258: Performed by: EMERGENCY MEDICINE

## 2019-06-06 PROCEDURE — 87077 CULTURE AEROBIC IDENTIFY: CPT

## 2019-06-06 PROCEDURE — 700102 HCHG RX REV CODE 250 W/ 637 OVERRIDE(OP): Performed by: HOSPITALIST

## 2019-06-06 PROCEDURE — G0378 HOSPITAL OBSERVATION PER HR: HCPCS

## 2019-06-06 PROCEDURE — 87086 URINE CULTURE/COLONY COUNT: CPT

## 2019-06-06 PROCEDURE — 96375 TX/PRO/DX INJ NEW DRUG ADDON: CPT

## 2019-06-06 PROCEDURE — 72170 X-RAY EXAM OF PELVIS: CPT

## 2019-06-06 PROCEDURE — 80048 BASIC METABOLIC PNL TOTAL CA: CPT

## 2019-06-06 PROCEDURE — 700105 HCHG RX REV CODE 258: Performed by: HOSPITALIST

## 2019-06-06 PROCEDURE — 99223 1ST HOSP IP/OBS HIGH 75: CPT | Mod: AI | Performed by: HOSPITALIST

## 2019-06-06 PROCEDURE — 700111 HCHG RX REV CODE 636 W/ 250 OVERRIDE (IP): Performed by: EMERGENCY MEDICINE

## 2019-06-06 PROCEDURE — 85025 COMPLETE CBC W/AUTO DIFF WBC: CPT

## 2019-06-06 RX ORDER — ASCORBIC ACID 500 MG
500 TABLET ORAL DAILY
Status: DISCONTINUED | OUTPATIENT
Start: 2019-06-07 | End: 2019-06-09 | Stop reason: HOSPADM

## 2019-06-06 RX ORDER — ACETAMINOPHEN 325 MG/1
650 TABLET ORAL EVERY 6 HOURS PRN
Status: DISCONTINUED | OUTPATIENT
Start: 2019-06-06 | End: 2019-06-09 | Stop reason: HOSPADM

## 2019-06-06 RX ORDER — CEFDINIR 300 MG/1
300 CAPSULE ORAL 2 TIMES DAILY
Status: ON HOLD | COMMUNITY
Start: 2019-05-07 | End: 2019-06-06

## 2019-06-06 RX ORDER — ONDANSETRON 2 MG/ML
4 INJECTION INTRAMUSCULAR; INTRAVENOUS ONCE
Status: COMPLETED | OUTPATIENT
Start: 2019-06-06 | End: 2019-06-06

## 2019-06-06 RX ORDER — SODIUM CHLORIDE 9 MG/ML
INJECTION, SOLUTION INTRAVENOUS CONTINUOUS
Status: DISCONTINUED | OUTPATIENT
Start: 2019-06-06 | End: 2019-06-07

## 2019-06-06 RX ADMIN — ONDANSETRON 4 MG: 2 INJECTION INTRAMUSCULAR; INTRAVENOUS at 18:28

## 2019-06-06 RX ADMIN — SODIUM CHLORIDE: 9 INJECTION, SOLUTION INTRAVENOUS at 18:18

## 2019-06-06 RX ADMIN — CEFTRIAXONE 1 G: 1 INJECTION, POWDER, FOR SOLUTION INTRAMUSCULAR; INTRAVENOUS at 18:04

## 2019-06-06 RX ADMIN — ASPIRIN 81 MG: 81 TABLET, COATED ORAL at 19:54

## 2019-06-06 ASSESSMENT — PAIN SCALES - PAIN ASSESSMENT IN ADVANCED DEMENTIA (PAINAD)
TOTALSCORE: 0
FACIALEXPRESSION: SMILING OR INEXPRESSIVE
CONSOLABILITY: NO NEED TO CONSOLE
BODYLANGUAGE: RELAXED
BREATHING: NORMAL

## 2019-06-06 ASSESSMENT — LIFESTYLE VARIABLES
EVER_SMOKED: NEVER
ALCOHOL_USE: NO

## 2019-06-06 ASSESSMENT — PATIENT HEALTH QUESTIONNAIRE - PHQ9
2. FEELING DOWN, DEPRESSED, IRRITABLE, OR HOPELESS: NOT AT ALL
SUM OF ALL RESPONSES TO PHQ9 QUESTIONS 1 AND 2: 0
1. LITTLE INTEREST OR PLEASURE IN DOING THINGS: NOT AT ALL

## 2019-06-06 ASSESSMENT — COPD QUESTIONNAIRES
DURING THE PAST 4 WEEKS HOW MUCH DID YOU FEEL SHORT OF BREATH: NONE/LITTLE OF THE TIME
DO YOU EVER COUGH UP ANY MUCUS OR PHLEGM?: NO/ONLY WITH OCCASIONAL COLDS OR INFECTIONS
HAVE YOU SMOKED AT LEAST 100 CIGARETTES IN YOUR ENTIRE LIFE: NO/DON'T KNOW
IN THE PAST 12 MONTHS DO YOU DO LESS THAN YOU USED TO BECAUSE OF YOUR BREATHING PROBLEMS: DISAGREE/UNSURE
COPD SCREENING SCORE: 2

## 2019-06-06 NOTE — ED TRIAGE NOTES
Pt MARY RIVAS from home where pt has caregiver with c/o diarrhea and cloudy foul smelling urine. Caregiver is not with pt so unable to obtain much history d/t dementia

## 2019-06-07 PROBLEM — D64.9 NORMOCYTIC ANEMIA: Status: ACTIVE | Noted: 2019-06-07

## 2019-06-07 LAB
ALBUMIN SERPL BCP-MCNC: 3.1 G/DL (ref 3.2–4.9)
ALBUMIN/GLOB SERPL: 0.8 G/DL
ALP SERPL-CCNC: 75 U/L (ref 30–99)
ALT SERPL-CCNC: 13 U/L (ref 2–50)
ANION GAP SERPL CALC-SCNC: 11 MMOL/L (ref 0–11.9)
AST SERPL-CCNC: 20 U/L (ref 12–45)
BASOPHILS # BLD AUTO: 0.3 % (ref 0–1.8)
BASOPHILS # BLD: 0.02 K/UL (ref 0–0.12)
BILIRUB SERPL-MCNC: 0.3 MG/DL (ref 0.1–1.5)
BUN SERPL-MCNC: 7 MG/DL (ref 8–22)
CALCIUM SERPL-MCNC: 8.4 MG/DL (ref 8.4–10.2)
CHLORIDE SERPL-SCNC: 99 MMOL/L (ref 96–112)
CO2 SERPL-SCNC: 24 MMOL/L (ref 20–33)
CREAT SERPL-MCNC: 0.63 MG/DL (ref 0.5–1.4)
EOSINOPHIL # BLD AUTO: 0.02 K/UL (ref 0–0.51)
EOSINOPHIL NFR BLD: 0.3 % (ref 0–6.9)
ERYTHROCYTE [DISTWIDTH] IN BLOOD BY AUTOMATED COUNT: 52 FL (ref 35.9–50)
GLOBULIN SER CALC-MCNC: 3.7 G/DL (ref 1.9–3.5)
GLUCOSE SERPL-MCNC: 126 MG/DL (ref 65–99)
HCT VFR BLD AUTO: 30.7 % (ref 37–47)
HGB BLD-MCNC: 9.9 G/DL (ref 12–16)
IMM GRANULOCYTES # BLD AUTO: 0.02 K/UL (ref 0–0.11)
IMM GRANULOCYTES NFR BLD AUTO: 0.3 % (ref 0–0.9)
IRON SATN MFR SERPL: 8 % (ref 15–55)
IRON SERPL-MCNC: 27 UG/DL (ref 40–170)
LYMPHOCYTES # BLD AUTO: 1.08 K/UL (ref 1–4.8)
LYMPHOCYTES NFR BLD: 16 % (ref 22–41)
MCH RBC QN AUTO: 27.8 PG (ref 27–33)
MCHC RBC AUTO-ENTMCNC: 32.2 G/DL (ref 33.6–35)
MCV RBC AUTO: 86.2 FL (ref 81.4–97.8)
MONOCYTES # BLD AUTO: 1 K/UL (ref 0–0.85)
MONOCYTES NFR BLD AUTO: 14.8 % (ref 0–13.4)
NEUTROPHILS # BLD AUTO: 4.62 K/UL (ref 2–7.15)
NEUTROPHILS NFR BLD: 68.3 % (ref 44–72)
NRBC # BLD AUTO: 0 K/UL
NRBC BLD-RTO: 0 /100 WBC
PLATELET # BLD AUTO: 340 K/UL (ref 164–446)
PMV BLD AUTO: 9.1 FL (ref 9–12.9)
POTASSIUM SERPL-SCNC: 3 MMOL/L (ref 3.6–5.5)
PROT SERPL-MCNC: 6.8 G/DL (ref 6–8.2)
RBC # BLD AUTO: 3.56 M/UL (ref 4.2–5.4)
SODIUM SERPL-SCNC: 134 MMOL/L (ref 135–145)
TIBC SERPL-MCNC: 356 UG/DL (ref 250–450)
WBC # BLD AUTO: 6.8 K/UL (ref 4.8–10.8)

## 2019-06-07 PROCEDURE — 700102 HCHG RX REV CODE 250 W/ 637 OVERRIDE(OP): Performed by: INTERNAL MEDICINE

## 2019-06-07 PROCEDURE — 97166 OT EVAL MOD COMPLEX 45 MIN: CPT

## 2019-06-07 PROCEDURE — A9270 NON-COVERED ITEM OR SERVICE: HCPCS | Performed by: HOSPITALIST

## 2019-06-07 PROCEDURE — 97163 PT EVAL HIGH COMPLEX 45 MIN: CPT

## 2019-06-07 PROCEDURE — 36415 COLL VENOUS BLD VENIPUNCTURE: CPT

## 2019-06-07 PROCEDURE — A9270 NON-COVERED ITEM OR SERVICE: HCPCS | Performed by: INTERNAL MEDICINE

## 2019-06-07 PROCEDURE — 96372 THER/PROPH/DIAG INJ SC/IM: CPT

## 2019-06-07 PROCEDURE — 700101 HCHG RX REV CODE 250: Performed by: INTERNAL MEDICINE

## 2019-06-07 PROCEDURE — G0378 HOSPITAL OBSERVATION PER HR: HCPCS

## 2019-06-07 PROCEDURE — 82728 ASSAY OF FERRITIN: CPT

## 2019-06-07 PROCEDURE — 83540 ASSAY OF IRON: CPT

## 2019-06-07 PROCEDURE — 80053 COMPREHEN METABOLIC PANEL: CPT

## 2019-06-07 PROCEDURE — 83550 IRON BINDING TEST: CPT

## 2019-06-07 PROCEDURE — 96366 THER/PROPH/DIAG IV INF ADDON: CPT

## 2019-06-07 PROCEDURE — 700102 HCHG RX REV CODE 250 W/ 637 OVERRIDE(OP): Performed by: HOSPITALIST

## 2019-06-07 PROCEDURE — 700111 HCHG RX REV CODE 636 W/ 250 OVERRIDE (IP): Performed by: HOSPITALIST

## 2019-06-07 PROCEDURE — 700105 HCHG RX REV CODE 258: Performed by: HOSPITALIST

## 2019-06-07 PROCEDURE — 99232 SBSQ HOSP IP/OBS MODERATE 35: CPT | Performed by: INTERNAL MEDICINE

## 2019-06-07 PROCEDURE — 85025 COMPLETE CBC W/AUTO DIFF WBC: CPT

## 2019-06-07 RX ORDER — FAMOTIDINE 20 MG/1
20 TABLET, FILM COATED ORAL DAILY
Status: DISCONTINUED | OUTPATIENT
Start: 2019-06-08 | End: 2019-06-09 | Stop reason: HOSPADM

## 2019-06-07 RX ORDER — SODIUM CHLORIDE AND POTASSIUM CHLORIDE 300; 900 MG/100ML; MG/100ML
INJECTION, SOLUTION INTRAVENOUS CONTINUOUS
Status: DISCONTINUED | OUTPATIENT
Start: 2019-06-07 | End: 2019-06-09 | Stop reason: HOSPADM

## 2019-06-07 RX ORDER — FAMOTIDINE 20 MG/1
20 TABLET, FILM COATED ORAL 2 TIMES DAILY
Status: DISCONTINUED | OUTPATIENT
Start: 2019-06-07 | End: 2019-06-07

## 2019-06-07 RX ADMIN — SODIUM CHLORIDE: 9 INJECTION, SOLUTION INTRAVENOUS at 05:21

## 2019-06-07 RX ADMIN — ENOXAPARIN SODIUM 40 MG: 100 INJECTION SUBCUTANEOUS at 05:16

## 2019-06-07 RX ADMIN — CEFTRIAXONE 1 G: 1 INJECTION, POWDER, FOR SOLUTION INTRAMUSCULAR; INTRAVENOUS at 17:36

## 2019-06-07 RX ADMIN — FAMOTIDINE 20 MG: 20 TABLET, FILM COATED ORAL at 09:17

## 2019-06-07 RX ADMIN — POTASSIUM CHLORIDE AND SODIUM CHLORIDE: 900; 300 INJECTION, SOLUTION INTRAVENOUS at 09:16

## 2019-06-07 RX ADMIN — OXYCODONE HYDROCHLORIDE AND ACETAMINOPHEN 500 MG: 500 TABLET ORAL at 05:14

## 2019-06-07 ASSESSMENT — COGNITIVE AND FUNCTIONAL STATUS - GENERAL
MOVING FROM LYING ON BACK TO SITTING ON SIDE OF FLAT BED: A LOT
MOVING TO AND FROM BED TO CHAIR: A LOT
SUGGESTED CMS G CODE MODIFIER MOBILITY: CL
WALKING IN HOSPITAL ROOM: A LOT
HELP NEEDED FOR BATHING: A LOT
DAILY ACTIVITIY SCORE: 15
MOVING FROM LYING ON BACK TO SITTING ON SIDE OF FLAT BED: UNABLE
DRESSING REGULAR UPPER BODY CLOTHING: A LITTLE
STANDING UP FROM CHAIR USING ARMS: A LOT
TURNING FROM BACK TO SIDE WHILE IN FLAT BAD: UNABLE
EATING MEALS: A LITTLE
SUGGESTED CMS G CODE MODIFIER DAILY ACTIVITY: CK
MOBILITY SCORE: 13
WALKING IN HOSPITAL ROOM: TOTAL
DRESSING REGULAR LOWER BODY CLOTHING: A LOT
SUGGESTED CMS G CODE MODIFIER MOBILITY: CN
TURNING FROM BACK TO SIDE WHILE IN FLAT BAD: A LITTLE
TOILETING: A LOT
MOVING TO AND FROM BED TO CHAIR: UNABLE
CLIMB 3 TO 5 STEPS WITH RAILING: TOTAL
CLIMB 3 TO 5 STEPS WITH RAILING: A LOT
PERSONAL GROOMING: A LITTLE
MOBILITY SCORE: 6
STANDING UP FROM CHAIR USING ARMS: TOTAL

## 2019-06-07 ASSESSMENT — GAIT ASSESSMENTS: GAIT LEVEL OF ASSIST: UNABLE TO PARTICIPATE

## 2019-06-07 ASSESSMENT — ACTIVITIES OF DAILY LIVING (ADL): TOILETING: REQUIRES ASSIST

## 2019-06-07 NOTE — ED NOTES
Per family, pt has an old healing pressure sore to her right hip and would like the pt to have a pillow under it when she sleeps.

## 2019-06-07 NOTE — ED NOTES
PT is unable to answer question reliably. Pt only know her name. Pt in not in any acute distress. Pt has a brace on her right hip,leg and ABD from a fall at home about 1 wk ago per EMS.  Joint assessment with ERP and RN

## 2019-06-07 NOTE — ED PROVIDER NOTES
ED Provider Note    CHIEF COMPLAINT  Chief Complaint   Patient presents with   • Diarrhea   • Other     cloudy urine       HPI  Svetlana Encinas is a 89 y.o. female who presents with weakness, diarrhea, and cloudy urine for the past 24 hours.  Patient was recently seen at this facility for recurrent hip dislocation.  Her caregiver found her with weakness malaise and severe diarrhea today.  No fever no chills    REVIEW OF SYSTEMS  See HPI for further details.  No cough or cold symptoms.  No vomiting.  Positive diarrhea.  All other systems are negative.    PAST MEDICAL HISTORY  Past Medical History:   Diagnosis Date   • Arthritis     shoulders, elbow and wrist, hands   • GERD (gastroesophageal reflux disease)    • Heart burn    • Indigestion    • OSTEOPOROSIS    • Pain     hands   • Unspecified cataract     nikita IOL   • Urinary bladder disorder    • Urinary incontinence        FAMILY HISTORY  History reviewed. No pertinent family history.    SOCIAL HISTORY  Social History     Social History   • Marital status:      Spouse name: N/A   • Number of children: N/A   • Years of education: N/A     Social History Main Topics   • Smoking status: Never Smoker   • Smokeless tobacco: Never Used   • Alcohol use No   • Drug use: No   • Sexual activity: No     Other Topics Concern   • Not on file     Social History Narrative   • No narrative on file       SURGICAL HISTORY  Past Surgical History:   Procedure Laterality Date   • CLOSED REDUCTION Right 6/2/2019    Procedure: CLOSED REDUCTION- HIP AND BRACED APPLICATION;  Surgeon: Matt Parsons M.D.;  [facility]: [facility];  Service: Orthopedics   • PTOSIS REPAIR  10/22/2014    Performed by Froylan Dunbar M.D. at SURGERY [facility]   • CARPAL TUNNEL RELEASE  4/21/2014    Performed by Felix Huffman M.D. at SURGERY SAME DAY [facility] ORS   • ATHROPLASTY     • CATARACT EXTRACTION WITH IOL      nikita eyes   • CHOLECYSTECTOMY     • HERNIA REPAIR     • OPEN  REDUCTION     • OTHER ORTHOPEDIC SURGERY      multiple surgeries on hips       CURRENT MEDICATIONS  @ He is Radha@    ALLERGIES  Allergies   Allergen Reactions   • Ciprofloxacin Rash     Pts daughter (Evelyn) reports rash all over body.   • Sulfamethoxazole-Trimethoprim Rash     Pts daughter (Evelyn) reports rash all over body.       PHYSICAL EXAM  VITAL SIGNS: BP (!) 174/79   Pulse 78   Temp 37.1 °C (98.7 °F) (Temporal)   Resp 18   Wt 47 kg (103 lb 9.9 oz)   SpO2 96%   BMI 19.58 kg/m²   Constitutional: Pleasant elderly female.  Awake alert.  Oriented x1   hENT: Normocephalic, Atraumatic, Bilateral external ears normal, Oropharynx dry  Eyes: THANIA, EOMI, Conjunctiva normal, No discharge.   Neck: Normal range of motion, No tenderness, Supple, No stridor. No nuchal rigidity  Lymphatic: No lymphadenopathy noted.   Cardiovascular: Normal heart rate, Normal rhythm, No murmurs, No rubs, No gallops.   Thorax & Lungs: Normal breath sounds, No respiratory distress,   : Incontinent of urine  Abdomen: Soft nontender  Musculoskeletal: Wearing a brace over her right hip  Skin: Warm, Dry, No erythema, No rash.   Back: No tenderness, No CVA tenderness.   Extremities: No edema, No tenderness, No cyanosis, No clubbing. Dorsalis pedis pulses 2+ equal bilaterally. Radial pulses 2+ equal bilaterally    RADIOLOGY/PROCEDURES  DX-CHEST-PORTABLE (1 VIEW)    (Results Pending)   DX-PELVIS-1 OR 2 VIEWS    (Results Pending)         Results for orders placed or performed during the hospital encounter of 06/06/19   CBC WITH DIFFERENTIAL   Result Value Ref Range    WBC 6.0 4.8 - 10.8 K/uL    RBC 3.86 (L) 4.20 - 5.40 M/uL    Hemoglobin 10.8 (L) 12.0 - 16.0 g/dL    Hematocrit 33.6 (L) 37.0 - 47.0 %    MCV 87.0 81.4 - 97.8 fL    MCH 28.0 27.0 - 33.0 pg    MCHC 32.1 (L) 33.6 - 35.0 g/dL    RDW 52.2 (H) 35.9 - 50.0 fL    Platelet Count 313 164 - 446 K/uL    MPV 10.1 9.0 - 12.9 fL    Neutrophils-Polys 81.60 (H) 44.00 - 72.00 %    Lymphocytes 9.80  (L) 22.00 - 41.00 %    Monocytes 7.80 0.00 - 13.40 %    Eosinophils 0.20 0.00 - 6.90 %    Basophils 0.30 0.00 - 1.80 %    Immature Granulocytes 0.30 0.00 - 0.90 %    Nucleated RBC 0.00 /100 WBC    Neutrophils (Absolute) 4.93 2.00 - 7.15 K/uL    Lymphs (Absolute) 0.59 (L) 1.00 - 4.80 K/uL    Monos (Absolute) 0.47 0.00 - 0.85 K/uL    Eos (Absolute) 0.01 0.00 - 0.51 K/uL    Baso (Absolute) 0.02 0.00 - 0.12 K/uL    Immature Granulocytes (abs) 0.02 0.00 - 0.11 K/uL    NRBC (Absolute) 0.00 K/uL   BASIC METABOLIC PANEL   Result Value Ref Range    Sodium 133 (L) 135 - 145 mmol/L    Potassium 3.4 (L) 3.6 - 5.5 mmol/L    Chloride 97 96 - 112 mmol/L    Co2 22 20 - 33 mmol/L    Glucose 168 (H) 65 - 99 mg/dL    Bun 12 8 - 22 mg/dL    Creatinine 0.72 0.50 - 1.40 mg/dL    Calcium 9.0 8.4 - 10.2 mg/dL    Anion Gap 14.0 (H) 0.0 - 11.9   URINALYSIS CULTURE, IF INDICATED   Result Value Ref Range    Color Yellow     Character Hazy (A)     Specific Gravity 1.010 <1.035    Ph 6.5 5.0 - 8.0    Glucose Negative Negative mg/dL    Ketones Trace (A) Negative mg/dL    Protein Negative Negative mg/dL    Bilirubin Negative Negative    Nitrite Negative Negative    Leukocyte Esterase Moderate (A) Negative    Occult Blood Small (A) Negative    Micro Urine Req Microscopic    LACTIC ACID   Result Value Ref Range    Lactic Acid 1.5 0.5 - 2.0 mmol/L   URINE MICROSCOPIC (W/UA)   Result Value Ref Range    WBC  (A) /hpf    RBC 2-5 (A) /hpf    Bacteria Many (A) None /hpf    Epithelial Cells Negative Few /hpf   ESTIMATED GFR   Result Value Ref Range    GFR If African American >60 >60 mL/min/1.73 m 2    GFR If Non African American >60 >60 mL/min/1.73 m 2       COURSE & MEDICAL DECISION MAKING  Pertinent Labs & Imaging studies reviewed. (See chart for details)  Patient has UTI with diarrhea and dehydration.  Patient will be [facility] by [facility].  Electrolytes and lactic acid are unremarkable.  Patient was given IV antibiotics    FINAL  IMPRESSION  1.  Acute UTI  2.  Dehydration  3.  Acute diarrhea    Please note that this dictation was created using voice recognition software. I have worked with consultants from the vendor as well as technical experts from [facility] to optimize the interface. I have made every reasonable attempt to correct obvious errors, but I expect that there are errors of grammar and possibly content that I did not discover before finalizing the note.    Electronically signed by: Mason Hawley, 6/6/2019 5:59 PM

## 2019-06-07 NOTE — ED NOTES
Daughter is at bedside and states that the patient has not had a history of recent falls. Pt has had multiple dislocation of her right hip and was place in a pelvic brace on Sunday by Dr. Parsons at [facility]. Pt is able to ambulate with the brace. Per Daughter she needs to have a pillow between her legs while in bed

## 2019-06-07 NOTE — PROGRESS NOTES
Pt arrived to room on [facility]. Transferred to bed via slide board. Tolerated well. Pt is A+Ox1, but is pleasant and cooperative. Pt denies pain at this time. Brace for R hip in place. CMS intact to BLE. Pt denies needs at this time. VSS. Will continue to monitor.

## 2019-06-07 NOTE — PROGRESS NOTES
2 RN skin assessment complete, incontinence associated dermatitis noted to sacral area. What appears to be a healing stage 2 pressure ulcer to coccyx - image uploaded. Bruising to R shin. Healed wound to R hip. Otherwise, skin is WNL. See wound flowsheet

## 2019-06-07 NOTE — H&P
Hospital Medicine History & Physical Note    Date of Service  6/6/2019    Primary Care Physician  Brad Garcia M.D.    Consultants  None    Code Status  DNAR/DNI    Chief Complaint  Chief Complaint   Patient presents with   • Diarrhea   • Other     cloudy urine       History of Presenting Illness  Huang is a very pleasant 89 y.o. female with a past medical history of Dementia, frequent UTIs (Follows with [facility] [place] infectious disease), recently had a right hip dislocation last week and was placed in a brace presented to the emergency room on 6/6/2019 for evaluation of generalized weakness, cloudy and smelly urine as well as diarrhea which started around 24 hours ago it has been persisting.  Apparently her caregiver found her weak and malaise.  No fevers chills were noted.  Patient is a very poor historian hence majority of the history was obtained by the patient's daughter at bedside.    Review of Systems  Review of Systems   Unable to perform ROS: Dementia       Past Medical History  Past Medical History:   Diagnosis Date   • Arthritis     shoulders, elbow and wrist, hands   • GERD (gastroesophageal reflux disease)    • Heart burn    • Indigestion    • OSTEOPOROSIS    • Pain     hands   • Unspecified cataract     nikita IOL   • Urinary bladder disorder    • Urinary incontinence        Surgical History   has a past surgical history that includes hernia repair; open reduction; other orthopedic surgery; cholecystectomy; cataract extraction with iol; carpal tunnel release (4/21/2014); athroplasty; ptosis repair (10/22/2014); and closed reduction (Right, 6/2/2019).    Family History  Unable to obtain from patient due to dementia    Social History   reports that she has never smoked. She has never used smokeless tobacco. She reports that she does not drink alcohol or use drugs.    Allergies  Allergies   Allergen Reactions   • Ciprofloxacin Rash     Pts daughter (Evelyn) reports rash all over body.   •  Sulfamethoxazole-Trimethoprim Rash     Pts daughter (Evelyn) reports rash all over body.       Medications  Prior to Admission medications    Medication Sig Start Date End Date Taking? Authorizing Provider   cefdinir (OMNICEF) 300 MG Cap Take 300 mg by mouth 2 times a day. 10 day course 5/7/19  Yes Physician Outpatient   tramadol (ULTRAM) 50 MG Tab Take 50 mg by mouth every 6 hours as needed for Moderate Pain.    Physician Outpatient   aspirin EC (ECOTRIN) 81 MG Tablet Delayed Response Take 81 mg by mouth every 48 hours.    Physician Outpatient   FIBER PO Take 1 Cap by mouth every day.    Physician Outpatient   MAGNESIUM PO Take 1 Cap by mouth every day.    Physician Outpatient   Probiotic Product (PROBIOTIC DAILY PO) Take 1 Cap by mouth every day.    Physician Outpatient   ascorbic acid (ASCORBIC ACID) 500 MG TABS Take 500 mg by mouth every day.    Physician Outpatient   VITAMIN D, CHOLECALCIFEROL, PO Take 1 Cap by mouth every day.    Physician Outpatient   Multiple Vitamins-Minerals (CENTRUM SILVER ADULT 50+ PO) Take 1 Tab by mouth every day.    Physician Outpatient   docusate sodium (DULCOLAX) 100 MG CAPS Take 100 mg by mouth every day.    Physician Outpatient   naproxen (ALEVE) 220 MG tablet Take 220 mg by mouth every 48 hours.    Physician Outpatient   omeprazole (PRILOSEC) 20 MG CPDR Take 20 mg by mouth every day.    Physician Outpatient       Physical Exam  Temp:  [37.1 °C (98.7 °F)] 37.1 °C (98.7 °F)  Pulse:  [78-83] 81  Resp:  [18] 18  BP: (174)/(79) 174/79  SpO2:  [94 %-97 %] 97 %  Physical Exam   Constitutional: She appears well-developed and well-nourished. No distress.   HENT:   Head: Normocephalic and atraumatic.   Mouth/Throat: No oropharyngeal exudate.   Mucous membranes dry   Eyes: Pupils are equal, round, and reactive to light. Conjunctivae are normal. Right eye exhibits no discharge. No scleral icterus.   Neck: Neck supple. No JVD present. No thyromegaly present.   Cardiovascular: Normal rate and  intact distal pulses.    No murmur heard.  Pulmonary/Chest: Effort normal and breath sounds normal. No stridor. No respiratory distress. She has no wheezes. She has no rales.   Abdominal: Soft. Bowel sounds are normal. She exhibits no distension. There is no tenderness. There is no rebound.   Musculoskeletal: She exhibits no edema.   Neurological: She is alert. No cranial nerve deficit.   Skin: Skin is warm. She is not diaphoretic. No erythema.   Psychiatric: She has a normal mood and affect. Her behavior is normal. Thought content normal.       Laboratory:  Recent Labs      06/06/19   1655   WBC  6.0   RBC  3.86*   HEMOGLOBIN  10.8*   HEMATOCRIT  33.6*   MCV  87.0   MCH  28.0   MCHC  32.1*   RDW  52.2*   PLATELETCT  313   MPV  10.1     Recent Labs      06/06/19   1655   SODIUM  133*   POTASSIUM  3.4*   CHLORIDE  97   CO2  22   GLUCOSE  168*   BUN  12   CREATININE  0.72   CALCIUM  9.0     Recent Labs      06/06/19   1655   GLUCOSE  168*               Urinalysis:          Imaging:  DX-CHEST-PORTABLE (1 VIEW)    (Results Pending)   DX-PELVIS-1 OR 2 VIEWS    (Results Pending)       Assessment/Plan:  I anticipate this patient is appropriate for observation status at this time.    * UTI (urinary tract infection)   Assessment & Plan    Patient has recurrent UTIs follows with serial [place] infectious disease, recently was on some type of antibiotic several weeks ago.  Awaiting for cultures  We will consult serial [place] infectious disease  Ceftriaxone started     Closed anterior dislocation of right hip (HCC)   Assessment & Plan    Recently had a dislocation last week, now patient is in a brace  Is a colonic patient therapy evaluation ordered     Hypokalemia   Assessment & Plan    Potassium supplementation ordered  Expect increase of 0.1 mEq/L per each 10 mEq given  Will recheck after supplementation  Lab Results   Component Value Date/Time    POTASSIUM 3.4 (L) 06/06/2019 04:55 PM   Recheck bmp in the am for changes        Diarrhea   Assessment & Plan    Recent antibiotic use, C. difficile will be ruled out     Dementia   Assessment & Plan    At baseline per family     GERD (gastroesophageal reflux disease)- (present on admission)   Assessment & Plan    We will avoid omeprazole at this time given high risk of C. difficile         VTE prophylaxis: Prophylaxis: lovenox

## 2019-06-07 NOTE — THERAPY
"Occupational Therapy Evaluation completed.   Functional Status:  Pt is a 89 y/o female, admit with UTI, recent R hip dislocation, and increasing weakness. Pt lives with her daughter and caregiver. PLOF- required assist in all areas of care- per son report, was able to AMB short distances. Pt presents with poor direction following, is at the total assist to Max A x2 level for ADLS and functional transfers. Pt is not at functional baseline- per son report, and will benefit from acute OT services.   Plan of Care: Will benefit from Occupational Therapy 3 times per week  Discharge Recommendations:  Equipment: Will Continue to Assess for Equipment Needs. Post-acute therapy Discharge to a transitional care facility for continued skilled therapy services.    See \"Rehab Therapy-Acute\" Patient Summary Report for complete documentation.    "

## 2019-06-07 NOTE — CARE PLAN
Problem: Safety  Goal: Will remain free from injury    Intervention: Provide assistance with mobility  Bed in low position, traded socks on, call light within reach. Pt instructed to call nurse for any further needs. Bed alarm in place        Problem: Skin Integrity  Goal: Risk for impaired skin integrity will decrease    Intervention: Assess risk factors for impaired skin integrity and/or pressure ulcers  Pt is q 2 turns, waffle mattress placed, soft silicone oxygen tubing in place, skin assessed throughout shift. No sores underneath hip brace. Pt has redness/excoriation which looks like a healing pressure ulcer or IAD, barrier cream applied.

## 2019-06-07 NOTE — CARE PLAN
Problem: Communication  Goal: The ability to communicate needs accurately and effectively will improve  Outcome: PROGRESSING AS EXPECTED  Does not use call light, able to communicate needs but sometimes struggles with word finding    Problem: Safety  Goal: Will remain free from injury  Outcome: PROGRESSING AS EXPECTED    Goal: Will remain free from falls  Outcome: PROGRESSING AS EXPECTED      Problem: Infection  Goal: Will remain free from infection  Outcome: NOT MET  UTI    Problem: Bowel/Gastric:  Goal: Normal bowel function is maintained or improved  Outcome: PROGRESSING AS EXPECTED  No BM since admit

## 2019-06-07 NOTE — PROGRESS NOTES
Hospital Medicine Daily Progress Note    Date of Service  6/7/2019    Chief Complaint  89 y.o. female admitted 6/6/2019 with foul smelling urine    Hospital Course    History of dementia and frequent UTIs admitted with foul-smelling urine found to have recurrent UTI.      Interval Problem Update  6/7: Urine cultures pending, she has improved slightly with ceftriaxone.  Drowsy, minimal history.  Son at bedside I updated him    Consultants/Specialty  Will discuss w [facility] when cultures return    Code Status  DNR/DNI    Disposition  Back home when medically stable, poss     Review of Systems  Review of Systems   Unable to perform ROS: Dementia   All other systems reviewed and are negative.       Physical Exam  Temp:  [36.9 °C (98.5 °F)-37.5 °C (99.5 °F)] 37.1 °C (98.7 °F)  Pulse:  [70-86] 70  Resp:  [18-20] 18  BP: (140-174)/(67-79) 140/67  SpO2:  [93 %-97 %] 93 %    Physical Exam   Constitutional: She appears well-developed and well-nourished. She appears lethargic. No distress.   Sitting up in chair, sleeping but woke up for examination   HENT:   Head: Normocephalic and atraumatic.   Mouth/Throat: No oropharyngeal exudate.   Eyes: Pupils are equal, round, and reactive to light. No scleral icterus.   Neck: No thyromegaly present.   Cardiovascular: Normal rate and regular rhythm.    Murmur heard.  Pulmonary/Chest: Effort normal and breath sounds normal. No respiratory distress. She has no wheezes.   Abdominal: She exhibits no distension. There is no tenderness.   Musculoskeletal: She exhibits no edema or tenderness.   Neurological: She appears lethargic.   Skin: Skin is warm and dry. No erythema. There is pallor.   Psychiatric: Her speech is delayed. She is slowed. Cognition and memory are impaired.       Fluids    Intake/Output Summary (Last 24 hours) at 06/07/19 1432  Last data filed at 06/07/19 1200   Gross per 24 hour   Intake              930 ml   Output                0 ml   Net              930 ml        Laboratory  Recent Labs      06/06/19   1655  06/07/19   0448   WBC  6.0  6.8   RBC  3.86*  3.56*   HEMOGLOBIN  10.8*  9.9*   HEMATOCRIT  33.6*  30.7*   MCV  87.0  86.2   MCH  28.0  27.8   MCHC  32.1*  32.2*   RDW  52.2*  52.0*   PLATELETCT  313  340   MPV  10.1  9.1     Recent Labs      06/06/19   1655  06/07/19   0448   SODIUM  133*  134*   POTASSIUM  3.4*  3.0*   CHLORIDE  97  99   CO2  22  24   GLUCOSE  168*  126*   BUN  12  7*   CREATININE  0.72  0.63   CALCIUM  9.0  8.4                   Imaging  DX-PELVIS-1 OR 2 VIEWS   Final Result      1.  Bilateral total hip arthroplasties, with femoral components projecting over acetabular components.   2.  No acute fracture is identified.   3.  Old right pubic rami fractures.   4.  Severe osteopenia.      DX-CHEST-PORTABLE (1 VIEW)   Final Result      1.  Small RIGHT pleural effusion, new from prior exam.   2.  Mildly prominent interstitium, likely chronic.   3.  No lobar pneumonia or pneumothorax.              Assessment/Plan  * UTI (urinary tract infection)   Assessment & Plan    Patient has recurrent UTIs follows with serial [place] infectious disease.  Has had multiple recent antibiotic courses  Awaiting for cultures  We will consult serial [place] infectious disease when cultures return  Ceftriaxone started no known history of resistance     Normocytic anemia   Assessment & Plan    Appears baseline, monitor for any evidence of bleeding  Check iron studies     Closed anterior dislocation of right hip (HCC)   Assessment & Plan    Recently had a dislocation last week, now patient is in a brace  Is a colonic patient therapy evaluation ordered     Hypokalemia   Assessment & Plan    Replace PO  Repeat in AM     Diarrhea   Assessment & Plan    Recent antibiotic use, C. difficile is pending  Continue contact isolation  WBC is ok     Dementia   Assessment & Plan    At baseline per family     GERD (gastroesophageal reflux disease)- (present on admission)   Assessment &  Plan    We will avoid omeprazole at this time given high risk of C. difficile          VTE prophylaxis: Lovenox

## 2019-06-07 NOTE — THERAPY
"Physical Therapy Evaluation completed.   Bed Mobility:  Supine to Sit: Maximal Assist (x2)  Transfers: Sit to Stand: Maximal Assist (x2)  Gait: Level Of Assist: Unable to Participate with Will Continue to Assess for Equipment Needs       Plan of Care: Will benefit from Physical Therapy 3 times per week  Discharge Recommendations: Equipment: Will Continue to Assess for Equipment Needs.     See \"Rehab Therapy-Acute\" Patient Summary Report for complete documentation.     Pt was recenlty admitted for diarrhea and UTI. Pt has had a recent R anterior hip dislocation per chart; will assume anterior hip precautions at this time. Pt also has order to have hip abduction brace to be on and pillow between knees when laying in bed. Pt presented with impaired balance, impaired coordination, impaired gait, weakness, and de activity tolerance. Pt was able to demonstrate Max A x2 for all functional mobility at this time. Pt does not appear to intiate or assist in movement at time and requires frequent cues for redirection. Pt demonstrated with strong posterior lean upon sitting EOB and required Max A for upright posture. Once pt was able to stand once, pt able to demonstrate improved sitting posture and was able to sit self. Pt will continue to benefit from skilled PT while in house. Unclear baseline functional mobility at this time, if family and caregiver were able to provide Max A for mobility pt may be able to d/c back home with recs for 24/7 assist and HH therpay. Otherwise pt will require post acute thearpy prior to d/c home given current objective findings, age, and PLOF.   "

## 2019-06-07 NOTE — ASSESSMENT & PLAN NOTE
Patient has recurrent UTIs follows with serial [place] infectious disease.  Has had multiple recent antibiotic courses.  So far she is improving/responding well to ctx  Awaiting for cultures  [facility] infectious disease is following  Ceftriaxone for now until sensitivities return

## 2019-06-07 NOTE — ED NOTES
Med rec complete per historically & pt home pharmacy             Pt home pharmacy reports that pt received a 10 day course of OMNICEF on 05-

## 2019-06-07 NOTE — PROGRESS NOTES
Received report from Tiffany GAFFNEY. Assumed care. This pt is AOx1 only to self, reports mild pain in hip, Patient and RN discussed plan of care including IV abx, stool sample, bc pending, IV fluids, Pt/OT: questions answered. Chart reviewed. Call light in place, fall precautions in place, patient educated on importance of calling for assistance. No additional needs at this time.

## 2019-06-07 NOTE — ASSESSMENT & PLAN NOTE
Recently had a dislocation last week, now patient is in a brace  Is a colonic patient therapy evaluation ordered

## 2019-06-08 PROBLEM — D50.9 IRON DEFICIENCY ANEMIA: Status: ACTIVE | Noted: 2019-06-08

## 2019-06-08 LAB
ALBUMIN SERPL BCP-MCNC: 2.9 G/DL (ref 3.2–4.9)
BASOPHILS # BLD AUTO: 0.4 % (ref 0–1.8)
BASOPHILS # BLD: 0.02 K/UL (ref 0–0.12)
BUN SERPL-MCNC: 10 MG/DL (ref 8–22)
CALCIUM SERPL-MCNC: 8.2 MG/DL (ref 8.4–10.2)
CHLORIDE SERPL-SCNC: 105 MMOL/L (ref 96–112)
CO2 SERPL-SCNC: 24 MMOL/L (ref 20–33)
CREAT SERPL-MCNC: 0.6 MG/DL (ref 0.5–1.4)
EOSINOPHIL # BLD AUTO: 0.09 K/UL (ref 0–0.51)
EOSINOPHIL NFR BLD: 1.7 % (ref 0–6.9)
ERYTHROCYTE [DISTWIDTH] IN BLOOD BY AUTOMATED COUNT: 51.3 FL (ref 35.9–50)
FERRITIN SERPL-MCNC: 26.8 NG/ML (ref 10–291)
GLUCOSE SERPL-MCNC: 96 MG/DL (ref 65–99)
HCT VFR BLD AUTO: 30.6 % (ref 37–47)
HGB BLD-MCNC: 9.7 G/DL (ref 12–16)
IMM GRANULOCYTES # BLD AUTO: 0.01 K/UL (ref 0–0.11)
IMM GRANULOCYTES NFR BLD AUTO: 0.2 % (ref 0–0.9)
LYMPHOCYTES # BLD AUTO: 1.47 K/UL (ref 1–4.8)
LYMPHOCYTES NFR BLD: 27.3 % (ref 22–41)
MCH RBC QN AUTO: 27.3 PG (ref 27–33)
MCHC RBC AUTO-ENTMCNC: 31.7 G/DL (ref 33.6–35)
MCV RBC AUTO: 86.2 FL (ref 81.4–97.8)
MONOCYTES # BLD AUTO: 0.68 K/UL (ref 0–0.85)
MONOCYTES NFR BLD AUTO: 12.6 % (ref 0–13.4)
NEUTROPHILS # BLD AUTO: 3.11 K/UL (ref 2–7.15)
NEUTROPHILS NFR BLD: 57.8 % (ref 44–72)
NRBC # BLD AUTO: 0 K/UL
NRBC BLD-RTO: 0 /100 WBC
PHOSPHATE SERPL-MCNC: 2.3 MG/DL (ref 2.5–4.5)
PLATELET # BLD AUTO: 312 K/UL (ref 164–446)
PMV BLD AUTO: 9.5 FL (ref 9–12.9)
POTASSIUM SERPL-SCNC: 3.1 MMOL/L (ref 3.6–5.5)
RBC # BLD AUTO: 3.55 M/UL (ref 4.2–5.4)
SODIUM SERPL-SCNC: 135 MMOL/L (ref 135–145)
WBC # BLD AUTO: 5.4 K/UL (ref 4.8–10.8)

## 2019-06-08 PROCEDURE — 700111 HCHG RX REV CODE 636 W/ 250 OVERRIDE (IP): Performed by: HOSPITALIST

## 2019-06-08 PROCEDURE — 36415 COLL VENOUS BLD VENIPUNCTURE: CPT

## 2019-06-08 PROCEDURE — 96375 TX/PRO/DX INJ NEW DRUG ADDON: CPT

## 2019-06-08 PROCEDURE — 80069 RENAL FUNCTION PANEL: CPT

## 2019-06-08 PROCEDURE — 700101 HCHG RX REV CODE 250: Performed by: INTERNAL MEDICINE

## 2019-06-08 PROCEDURE — 99232 SBSQ HOSP IP/OBS MODERATE 35: CPT | Performed by: INTERNAL MEDICINE

## 2019-06-08 PROCEDURE — 700111 HCHG RX REV CODE 636 W/ 250 OVERRIDE (IP): Performed by: INTERNAL MEDICINE

## 2019-06-08 PROCEDURE — 770006 HCHG ROOM/CARE - MED/SURG/GYN SEMI*

## 2019-06-08 PROCEDURE — 700102 HCHG RX REV CODE 250 W/ 637 OVERRIDE(OP): Performed by: INTERNAL MEDICINE

## 2019-06-08 PROCEDURE — 85025 COMPLETE CBC W/AUTO DIFF WBC: CPT

## 2019-06-08 PROCEDURE — 700102 HCHG RX REV CODE 250 W/ 637 OVERRIDE(OP): Performed by: HOSPITALIST

## 2019-06-08 PROCEDURE — 96372 THER/PROPH/DIAG INJ SC/IM: CPT

## 2019-06-08 PROCEDURE — A9270 NON-COVERED ITEM OR SERVICE: HCPCS | Performed by: HOSPITALIST

## 2019-06-08 PROCEDURE — 700105 HCHG RX REV CODE 258: Performed by: HOSPITALIST

## 2019-06-08 PROCEDURE — 97530 THERAPEUTIC ACTIVITIES: CPT

## 2019-06-08 PROCEDURE — 96366 THER/PROPH/DIAG IV INF ADDON: CPT

## 2019-06-08 PROCEDURE — A9270 NON-COVERED ITEM OR SERVICE: HCPCS | Performed by: INTERNAL MEDICINE

## 2019-06-08 RX ORDER — FERROUS SULFATE 325(65) MG
325 TABLET ORAL
Status: DISCONTINUED | OUTPATIENT
Start: 2019-06-08 | End: 2019-06-09 | Stop reason: HOSPADM

## 2019-06-08 RX ORDER — ONDANSETRON 2 MG/ML
4 INJECTION INTRAMUSCULAR; INTRAVENOUS EVERY 4 HOURS PRN
Status: DISCONTINUED | OUTPATIENT
Start: 2019-06-08 | End: 2019-06-09 | Stop reason: HOSPADM

## 2019-06-08 RX ADMIN — POTASSIUM CHLORIDE AND SODIUM CHLORIDE: 900; 300 INJECTION, SOLUTION INTRAVENOUS at 18:39

## 2019-06-08 RX ADMIN — ENOXAPARIN SODIUM 40 MG: 100 INJECTION SUBCUTANEOUS at 06:16

## 2019-06-08 RX ADMIN — CEFTRIAXONE 1 G: 1 INJECTION, POWDER, FOR SOLUTION INTRAMUSCULAR; INTRAVENOUS at 18:34

## 2019-06-08 RX ADMIN — OXYCODONE HYDROCHLORIDE AND ACETAMINOPHEN 500 MG: 500 TABLET ORAL at 06:15

## 2019-06-08 RX ADMIN — FERROUS SULFATE TAB 325 MG (65 MG ELEMENTAL FE) 325 MG: 325 (65 FE) TAB at 09:08

## 2019-06-08 RX ADMIN — ACETAMINOPHEN 650 MG: 325 TABLET, FILM COATED ORAL at 16:16

## 2019-06-08 RX ADMIN — ONDANSETRON 4 MG: 2 INJECTION INTRAMUSCULAR; INTRAVENOUS at 15:10

## 2019-06-08 RX ADMIN — FAMOTIDINE 20 MG: 20 TABLET, FILM COATED ORAL at 06:14

## 2019-06-08 RX ADMIN — ASPIRIN 81 MG: 81 TABLET, COATED ORAL at 18:34

## 2019-06-08 ASSESSMENT — COGNITIVE AND FUNCTIONAL STATUS - GENERAL
WALKING IN HOSPITAL ROOM: TOTAL
STANDING UP FROM CHAIR USING ARMS: A LOT
MOVING TO AND FROM BED TO CHAIR: A LOT
CLIMB 3 TO 5 STEPS WITH RAILING: TOTAL
SUGGESTED CMS G CODE MODIFIER MOBILITY: CM
MOBILITY SCORE: 8
MOVING FROM LYING ON BACK TO SITTING ON SIDE OF FLAT BED: UNABLE
TURNING FROM BACK TO SIDE WHILE IN FLAT BAD: UNABLE

## 2019-06-08 ASSESSMENT — GAIT ASSESSMENTS
GAIT LEVEL OF ASSIST: MAXIMAL ASSIST
ASSISTIVE DEVICE: NONE;HAND HELD ASSIST
DISTANCE (FEET): 2
DEVIATION: DECREASED BASE OF SUPPORT;SHUFFLED GAIT;BRADYKINETIC

## 2019-06-08 NOTE — PROGRESS NOTES
[facility] faxed urine culture results. Dr. Roberts notified. New orders for pt to be discharged with Cefdinir 300 mg BID for 5 additional days.

## 2019-06-08 NOTE — CONSULTS
DATE OF SERVICE:  06/08/2019    INFECTIOUS DISEASE CONSULTATION    REQUESTING PHYSICIAN:  Dr. Emma Ashby    CHIEF COMPLAINT:  Foul odor, shakiness, concerning for urinary tract   infection.    REASON FOR CONSULTATION:  Recurrent urinary tract infection.    HISTORY OF PRESENT ILLNESS:  The patient is a 89-year-old female well known to   our [facility] Infectious Disease service with a history of recurrent UTI, has   been a clinic patient of ours since 09/2018 with an initial referral from   [facility] [place] for recurrent UTIs dating all the way back to 06/2014, history   is significant for dementia and arthritis.  She presents to the hospital after   daughter notified our office that her mother had foul odor in urine, was   pale, shaky.  At the time we had suggested the patient go to the ER.  We had   recently collected a urine culture that was sent to [facility] at [place]   on 06/04/2019 with preliminary results growing E. coli with pending   susceptibilities.  She was then admitted to [facility].  Upon   arrival, she did not exhibit any fever or leukocytosis.  The patient   unfortunately has dementia.  She denies any abdominal pain, dysuria, or   frequency.  Her initial UA shows  wbc's with pending urine cultures.    Her blood cultures remained no growth at this time.  She was started based on   her prior susceptibility and urine culture with latest Klebsiella pneumoniae   with relatively no resistance, she was started on ceftriaxone and this had   been addressed previously in our office.  At that time, she had asymptomatic   bacteriuria consistent with her condition, so we had decided not to treat in   our office at that time.  Patient currently appears to be at her baseline.    She continues to be afebrile.  There was concern that she had diarrhea in the   beginning, but now she is constipated.  The patient recently had a hip surgery   due to hip dislocation a week prior to admission; currently  has a brace.    REVIEW OF SYSTEMS:  A 14-point review of system obtained and is negative   except for HPI.    PAST MEDICAL HISTORY:  1.  Dementia.  2.  Recurrent urinary tract infection.  3.  Rectal prolapse.  4.  Dementia.  5.  Recent hip dislocation.    PAST SURGICAL HISTORY:  Hernia repair, cholecystectomy, carpal tunnel release   arthroplasty, ptosis repair, closed reduction on 06/02/2019, recent cystoscopy   in 04/2019 with [facility] [place].    ALLERGIES:  BACTRIM AND CIPRO CAUSES A RASH.    FAMILY HISTORY:  Unable to obtain due to dementia.    SOCIAL HISTORY:  She lives at home with her daughter and son-in-law; has a   , Praveen.  No tobacco, drug or alcohol abuse.    PHYSICAL EXAMINATION:  VITAL SIGNS:  Temperature 36.7, respirations 20, pulse ____ 96, blood pressure   140/67.  GENERAL:  Patient is calm, pleasant, without acute distress.  HEENT:  Head is normocephalic and atraumatic.  Oral mucous membranes are   moist.  Eyes, PERRLA.  No scleral icterus or conjunctival injection.  CARDIOVASCULAR:  Regular rate and rhythm.  She has a soft systolic apical   murmur.  RESPIRATORY:  Lungs are clear to auscultation bilaterally.  ABDOMEN:  Soft and nondistended.  MUSCULOSKELETAL:  She has a right ortho brace of her hip.  EXTREMITIES:  No edema.  INTEGUMENTARY:  Skin, no skin lesions or visible rash.  PSYCHIATRIC:  She has an appropriate mood and affect.  She is pleasant.  NEUROLOGIC:  She is alert, not completely oriented.  No cranial deficits.    LABORATORY DATA:  WBC 6.8, hemoglobin 9.9, platelet count 340, neutrophils   68%.  Sodium is 134, potassium 3, chloride 99, bicarbonate 24, BUN 7,   creatinine 0.63.  Blood cultures from 06/06/2019, shows no growth to date.  UA   shows  WBC, many bacteria.  Urine culture is pending.    ASSESSMENT:  1.  Symptomatic urinary tract infection.  2.  Recent right hip dislocation.  3.  Dementia.  4.  Rectal prolapse.  5.  Osteoarthritis.    RECOMMENDATIONS:  The  patient has clinical evidence of symptomatic urinary   tract infection.  She has had a recurrent history of urinary tract infections,   partially due to multiple comorbidities, including dementia and her rectal   prolapse.  Most likely recently her episodes likely due to her recent right   hip dislocation and associated immobility; preliminarily I was told by my   office that she is growing E. coli from a recent urine culture drawn on   06/04/2019 from [facility] [place].  I have asked the nurse to obtain   results and notify me of her susceptibility.  I am confident that we can   switch to oral antibiotics once these results and MARYBEL has returned.  At this   time she seems to be responding to current antibiotic therapy.  Fortunately,   the patient has not really had a history or exhibit any multidrug resistance   in the past.  SHE HAS ALLERGIES TO CIPRO AND BACTRIM WITH ASSOCIATED RASH; so   we might have some limitations with oral antibiotics, but I am sure we can   find a solution after reviewing her urine culture.  Continue ceftriaxone for   now.    Awaiting urine culture results from [facility]; once results are reviewed, I am   confident we can transition to oral therapy.  We will treat for duration of   5-7 days.  Patient also has some associated diarrhea.  Currently, she is   constipated.  I doubt this is C. diff related.    Fifty-five minutes was spent in direct care of this patient.    Thank you for allowing me to participate in the care of this patient.       ____________________________________     DO JORGE Ozuna / SHERYL    DD:  06/08/2019 06:31:28  DT:  06/08/2019 07:21:44    D#:  [id number]  Job#:  [id number]

## 2019-06-08 NOTE — THERAPY
"Physical Therapy Treatment completed.   Bed Mobility:  Supine to Sit: Maximal Assist  Transfers: Sit to Stand: Maximal Assist (x 2)  Gait: Level Of Assist: Maximal Assist with No Equipment Needed HHA x 2 people     Plan of Care: Will benefit from Physical Therapy 3 times per week  Discharge Recommendations: Equipment: Will Continue to Assess for Equipment Needs. Post-acute therapy Discharge to a transitional care facility for continued skilled therapy services.     See \"Rehab Therapy-Acute\" Patient Summary Report for complete documentation.       "

## 2019-06-08 NOTE — PROGRESS NOTES
[facility] [place] was called to obtain lab results regarding recent urine culture. A message was left on voicemail with unit call back number.

## 2019-06-08 NOTE — FACE TO FACE
Face to Face Supporting Documentation - Home Health    The encounter with this patient was in whole or in part the primary reason for home health admission.    Date of encounter:   Patient:                    MRN:                       YOB: 2019  Svetlana Encinas  [id number]  10/19/1925     Home health to see patient for:  Skilled Nursing care for assessment, interventions & education, Registered dietitian consult, Medical social work consult, Home health aide, Physical Therapy evaluation and treatment and Occupational therapy evaluation and treatment    Skilled need for:  Exacerbation of Chronic Disease State Recurrent UTI    Skilled nursing interventions to include:  Comment: None    Homebound status evidenced by:  Need the aid of supportive devices such as crutches, canes, wheelchairs or walkers. Leaving home requires a considerable and taxing effort. There is a normal inability to leave the home.    Community Physician to provide follow up care: Brad Garcia M.D.     Optional Interventions? No      I certify the face to face encounter for this home health care referral meets the CMS requirements and the encounter/clinical assessment with the patient was, in whole, or in part, for the medical condition(s) listed above, which is the primary reason for home health care. Based on my clinical findings: the service(s) are medically necessary, support the need for home health care, and the homebound criteria are met.  I certify that this patient has had a face to face encounter by myself.  Emma Ashby D.O. - NPI: [id number]

## 2019-06-08 NOTE — PROGRESS NOTES
[facility] Medicine Daily Progress Note    Date of Service  6/8/2019    Chief Complaint  89 y.o. female admitted 6/6/2019 with foul smelling urine    Hospital Course    History of dementia and frequent UTIs admitted with foul-smelling urine found to have recurrent UTI.      Interval Problem Update  6/7: Urine cultures pending, she has improved slightly with ceftriaxone.  Drowsy, minimal history.  Son at bedside I updated him  6/8: Improving w treatment, Na and K better, pending sensitivities, tolerating ctx.    Consultants/Specialty  [place] ID    Code Status  DNR/DNI    Disposition  Back home when medically stable, poss     Review of Systems  Review of Systems   Unable to perform ROS: Dementia   All other systems reviewed and are negative.       Physical Exam  Temp:  [36.7 °C (98 °F)-37.2 °C (98.9 °F)] 36.7 °C (98 °F)  Pulse:  [71-76] 71  Resp:  [18-20] 20  BP: (122-155)/(65-81) 155/81  SpO2:  [94 %-96 %] 94 %    Physical Exam   Constitutional: She appears well-developed and well-nourished. She appears lethargic. No distress.   Comfortable   HENT:   Head: Normocephalic and atraumatic.   Mouth/Throat: No oropharyngeal exudate.   Eyes: Pupils are equal, round, and reactive to light. No scleral icterus.   Neck: No thyromegaly present.   Cardiovascular: Normal rate and regular rhythm.    Murmur heard.  Pulmonary/Chest: Effort normal and breath sounds normal. No respiratory distress. She has no wheezes.   Abdominal: She exhibits no distension. There is no tenderness.   Musculoskeletal: She exhibits no edema or tenderness.   Neurological: She appears lethargic.   Skin: Skin is warm and dry. No erythema. There is pallor.   Psychiatric: Her speech is delayed. She is slowed. Cognition and memory are impaired.       Fluids    Intake/Output Summary (Last 24 hours) at 06/08/19 1150  Last data filed at 06/08/19 0754   Gross per 24 hour   Intake              925 ml   Output                0 ml   Net              925 ml        Laboratory  Recent Labs      06/06/19   1655  06/07/19   0448  06/08/19   0600   WBC  6.0  6.8  5.4   RBC  3.86*  3.56*  3.55*   HEMOGLOBIN  10.8*  9.9*  9.7*   HEMATOCRIT  33.6*  30.7*  30.6*   MCV  87.0  86.2  86.2   MCH  28.0  27.8  27.3   MCHC  32.1*  32.2*  31.7*   RDW  52.2*  52.0*  51.3*   PLATELETCT  313  340  312   MPV  10.1  9.1  9.5     Recent Labs      06/06/19   1655  06/07/19   0448  06/08/19   0600   SODIUM  133*  134*  135   POTASSIUM  3.4*  3.0*  3.1*   CHLORIDE  97  99  105   CO2  22  24  24   GLUCOSE  168*  126*  96   BUN  12  7*  10   CREATININE  0.72  0.63  0.60   CALCIUM  9.0  8.4  8.2*                   Imaging  DX-PELVIS-1 OR 2 VIEWS   Final Result      1.  Bilateral total hip arthroplasties, with femoral components projecting over acetabular components.   2.  No acute fracture is identified.   3.  Old right pubic rami fractures.   4.  Severe osteopenia.      DX-CHEST-PORTABLE (1 VIEW)   Final Result      1.  Small RIGHT pleural effusion, new from prior exam.   2.  Mildly prominent interstitium, likely chronic.   3.  No lobar pneumonia or pneumothorax.              Assessment/Plan  * UTI (urinary tract infection)   Assessment & Plan    Patient has recurrent UTIs follows with [facility] infectious disease.  Has had multiple recent antibiotic courses.  So far she is improving/responding well to ctx  Awaiting for cultures  [facility] infectious disease is following  Ceftriaxone for now until sensitivities return     Closed anterior dislocation of right hip (HCC)   Assessment & Plan    Recently had a dislocation last week, now patient is in a brace  Is a colonic patient therapy evaluation ordered     Hypokalemia   Assessment & Plan    Replace PO  Repeat in AM     Diarrhea   Assessment & Plan    Recent antibiotic use, C. difficile is pending  Continue contact isolation  WBC is ok     Dementia   Assessment & Plan    At baseline per family     GERD (gastroesophageal reflux disease)-  (present on admission)   Assessment & Plan    We will avoid omeprazole at this time given high risk of C. difficile          VTE prophylaxis: Lovenox

## 2019-06-09 ENCOUNTER — PATIENT OUTREACH (OUTPATIENT)
Dept: HEALTH INFORMATION MANAGEMENT | Facility: OTHER | Age: 84
End: 2019-06-09

## 2019-06-09 VITALS
HEIGHT: 61 IN | WEIGHT: 103.62 LBS | SYSTOLIC BLOOD PRESSURE: 175 MMHG | OXYGEN SATURATION: 94 % | DIASTOLIC BLOOD PRESSURE: 88 MMHG | TEMPERATURE: 98.5 F | BODY MASS INDEX: 19.56 KG/M2 | HEART RATE: 79 BPM | RESPIRATION RATE: 20 BRPM

## 2019-06-09 LAB
ALBUMIN SERPL BCP-MCNC: 3.1 G/DL (ref 3.2–4.9)
BACTERIA UR CULT: ABNORMAL
BACTERIA UR CULT: ABNORMAL
BUN SERPL-MCNC: 8 MG/DL (ref 8–22)
CALCIUM SERPL-MCNC: 8.3 MG/DL (ref 8.4–10.2)
CHLORIDE SERPL-SCNC: 100 MMOL/L (ref 96–112)
CO2 SERPL-SCNC: 22 MMOL/L (ref 20–33)
CREAT SERPL-MCNC: 0.65 MG/DL (ref 0.5–1.4)
GLUCOSE SERPL-MCNC: 114 MG/DL (ref 65–99)
PHOSPHATE SERPL-MCNC: 2.2 MG/DL (ref 2.5–4.5)
POTASSIUM SERPL-SCNC: 3.2 MMOL/L (ref 3.6–5.5)
SIGNIFICANT IND 70042: ABNORMAL
SITE SITE: ABNORMAL
SODIUM SERPL-SCNC: 134 MMOL/L (ref 135–145)
SOURCE SOURCE: ABNORMAL

## 2019-06-09 PROCEDURE — A9270 NON-COVERED ITEM OR SERVICE: HCPCS | Performed by: INTERNAL MEDICINE

## 2019-06-09 PROCEDURE — 99239 HOSP IP/OBS DSCHRG MGMT >30: CPT | Performed by: INTERNAL MEDICINE

## 2019-06-09 PROCEDURE — 700111 HCHG RX REV CODE 636 W/ 250 OVERRIDE (IP): Performed by: HOSPITALIST

## 2019-06-09 PROCEDURE — A9270 NON-COVERED ITEM OR SERVICE: HCPCS | Performed by: HOSPITALIST

## 2019-06-09 PROCEDURE — 700102 HCHG RX REV CODE 250 W/ 637 OVERRIDE(OP): Performed by: INTERNAL MEDICINE

## 2019-06-09 PROCEDURE — 96372 THER/PROPH/DIAG INJ SC/IM: CPT

## 2019-06-09 PROCEDURE — 700102 HCHG RX REV CODE 250 W/ 637 OVERRIDE(OP): Performed by: HOSPITALIST

## 2019-06-09 PROCEDURE — 700111 HCHG RX REV CODE 636 W/ 250 OVERRIDE (IP): Performed by: INTERNAL MEDICINE

## 2019-06-09 PROCEDURE — 36415 COLL VENOUS BLD VENIPUNCTURE: CPT

## 2019-06-09 PROCEDURE — 80069 RENAL FUNCTION PANEL: CPT

## 2019-06-09 PROCEDURE — 700101 HCHG RX REV CODE 250: Performed by: INTERNAL MEDICINE

## 2019-06-09 RX ORDER — CEFDINIR 300 MG/1
300 CAPSULE ORAL 2 TIMES DAILY
Qty: 8 CAP | Refills: 0 | Status: SHIPPED | OUTPATIENT
Start: 2019-06-10 | End: 2019-06-14

## 2019-06-09 RX ORDER — FERROUS SULFATE 325(65) MG
325 TABLET ORAL
Qty: 30 TAB | Refills: 1 | Status: SHIPPED | OUTPATIENT
Start: 2019-06-10

## 2019-06-09 RX ORDER — ONDANSETRON HYDROCHLORIDE 4 MG/5ML
4 SOLUTION ORAL EVERY 6 HOURS PRN
Qty: 1 BOTTLE | Refills: 0 | Status: SHIPPED | OUTPATIENT
Start: 2019-06-09

## 2019-06-09 RX ORDER — ONDANSETRON 4 MG/1
4 TABLET, ORALLY DISINTEGRATING ORAL EVERY 4 HOURS PRN
Status: DISCONTINUED | OUTPATIENT
Start: 2019-06-09 | End: 2019-06-09 | Stop reason: HOSPADM

## 2019-06-09 RX ADMIN — OXYCODONE HYDROCHLORIDE AND ACETAMINOPHEN 500 MG: 500 TABLET ORAL at 05:10

## 2019-06-09 RX ADMIN — POTASSIUM CHLORIDE AND SODIUM CHLORIDE: 900; 300 INJECTION, SOLUTION INTRAVENOUS at 05:18

## 2019-06-09 RX ADMIN — FAMOTIDINE 20 MG: 20 TABLET, FILM COATED ORAL at 05:10

## 2019-06-09 RX ADMIN — ENOXAPARIN SODIUM 40 MG: 100 INJECTION SUBCUTANEOUS at 05:10

## 2019-06-09 RX ADMIN — ONDANSETRON 4 MG: 4 TABLET, ORALLY DISINTEGRATING ORAL at 10:16

## 2019-06-09 NOTE — PROGRESS NOTES
Infectious Disease Progress Note    Author: EzekielTristan Alejandra Date & Time created: 6/9/2019  6:02 AM    Interval History:  Ceftriaxone 6/6-present    6/10: RN reported results from [facility] with E.coli from 6/4 urine cultures. No fever, tolerating antibiotics. Waiting on arrangement for home health.     Review of Systems:  Review of Systems   All other systems reviewed and are negative.      Physical Exam:  Physical Exam   Constitutional: She appears well-developed and well-nourished.   HENT:   Head: Normocephalic.   Cardiovascular: Normal rate and regular rhythm.    Pulmonary/Chest: Effort normal and breath sounds normal.   Abdominal: Soft. Bowel sounds are normal. She exhibits no distension. There is no tenderness.   Neurological: She is alert.       Labs:  No results found for this or any previous visit (from the past 24 hour(s)).  Results     Procedure Component Value Units Date/Time    URINE CULTURE(NEW) [[id number]]  (Abnormal) Collected:  06/06/19 1708    Order Status:  Completed Specimen:  Urine Updated:  06/08/19 1105     Significant Indicator POS (POS)     Source UR     Site -     Culture Result - (A)      Lactose fermenting Gram negative [place]  >100,000 cfu/mL   (A)    BLOOD CULTURE [[id number]] Collected:  06/06/19 1758    Order Status:  Completed Specimen:  Blood from Peripheral Updated:  06/07/19 0710     Significant Indicator NEG     Source BLD     Site PERIPHERAL     Culture Result No Growth  Note: Blood cultures are incubated for 5 days and  are monitored continuously.Positive blood cultures  are called to the RN and reported as soon as  they are identified.  Blood culture testing and Gram stain, if indicated, are  performed at [facility], [address] [place] [address].  Positive blood cultures are  sent to [facility], [address] [place] [address], for organism identification and  susceptibility testing.      Narrative:       Per Hospital Policy: Only  "change Specimen Src: to \"Line\" if  specified by physician order.  Left AC    BLOOD CULTURE [[id number]] Collected:  19 1736    Order Status:  Completed Specimen:  Blood from Peripheral Updated:  19 0710     Significant Indicator NEG     Source BLD     Site PERIPHERAL     Culture Result No Growth  Note: Blood cultures are incubated for 5 days and  are monitored continuously.Positive blood cultures  are called to the RN and reported as soon as  they are identified.  Blood culture testing and Gram stain, if indicated, are  performed at [facility], [address] [place] [address].  Positive blood cultures are  sent to [facility], [address] [place] [address], for organism identification and  susceptibility testing.      Narrative:       Per Hospital Policy: Only change Specimen Src: to \"Line\" if  specified by physician order.  Right AC    C Diff by PCR rflx Toxin [[id number]]     Order Status:  Canceled Specimen:  Stool from Stool     URINALYSIS CULTURE, IF INDICATED [[id number]]  (Abnormal) Collected:  19 170    Order Status:  Completed Specimen:  Blood Updated:  19     Color Yellow     Character Hazy (A)     Specific Gravity 1.010     Ph 6.5     Glucose Negative mg/dL      Ketones Trace (A) mg/dL      Protein Negative mg/dL      Bilirubin Negative     Nitrite Negative     Leukocyte Esterase Moderate (A)     Occult Blood Small (A)     Micro Urine Req Microscopic        Hemodynamics:  Temp (24hrs), Av.7 °C (98 °F), Min:36.4 °C (97.6 °F), Max:36.9 °C (98.4 °F)  Temperature: 36.4 °C (97.6 °F)  Pulse  Av.6  Min: 70  Max: 86   Blood Pressure : 131/72     Peripheral IV 19 20 G Right Wrist (Active)       Peripheral IV 19 20 G Right Wrist (Active)       Peripheral IV 19 20 G Right Forearm (Active)   Site Assessment Clean;Dry;Intact 2019 12:00 AM   Dressing Type Transparent 2019 12:00 AM   Line Status Infusing " 6/9/2019 12:00 AM   Dressing Status Clean;Dry;Intact 6/9/2019 12:00 AM   Dressing Intervention N/A 6/9/2019 12:00 AM   Date Primary Tubing Changed 06/06/19 6/7/2019  8:00 AM   Date Secondary Tubing Changed 06/07/19 6/7/2019  8:00 AM   NEXT Primary Tubing Change  06/08/19 6/7/2019  8:00 AM   NEXT Secondary Tubing Change  06/08/19 6/7/2019  8:00 AM   Infiltration Grading ([facility]) 0 6/9/2019 12:00 AM   Phlebitis Scale ([facility] Only) 0 6/9/2019 12:00 AM     Wound:  Surgical Incision  Surgical Incision Right Fingertip to Tourniquet (Active)       Surgical Incision  Surgical Incision Bilateral Eye (Active)        Fluids:  Intake/Output       06/07/19 0700 - 06/08/19 0659 06/08/19 0700 - 06/09/19 0659 06/09/19 0700 - 06/10/19 0659      9962-7127 1788-6670 Total 9576-7896 8550-6825 Total 5765-2707 6856-2953 Total       Intake    P.O.  60  240 300  120  180 300  --  -- --    P.O. 60 240 300 120 180 300 -- -- --    I.V.  505  -- 505  --  375 375  --  -- --    Volume (mL) (0.9 % NaCl with KCl 40 mEq 1,000 mL) 505 -- 505 -- 375 375 -- -- --    Total Intake 565 240 805 120 555 675 -- -- --       Output    Urine  --  -- --  150  -- 150  --  -- --    Number of Times Voided -- 3 x 3 x 3 x -- 3 x -- -- --    Urine Void (mL) -- -- -- 150 -- 150 -- -- --    Stool  --  -- --  --  -- --  --  -- --    Number of Times Stooled -- -- -- -- 0 x 0 x -- -- --    Total Output -- -- -- 150 -- 150 -- -- --       Net I/O     565 240 805 -30 637 525 -- -- --           GI/Nutrition:  Orders Placed This Encounter   Procedures   • Diet Order Regular     Standing Status:   Standing     Number of Occurrences:   1     Order Specific Question:   Diet:     Answer:   Regular [1]     Medications:  Current Facility-Administered Medications   Medication Last Dose   • ferrous sulfate tablet 325 mg 325 mg at 06/08/19 0908   • ondansetron (ZOFRAN) syringe/vial injection 4 mg 4 mg at 06/08/19 1510   • 0.9 % NaCl with KCl 40 mEq 1,000 mL     • famotidine  (PEPCID) tablet 20 mg 20 mg at 06/09/19 0510   • aspirin EC (ECOTRIN) tablet 81 mg 81 mg at 06/08/19 1834   • ascorbic acid tablet 500 mg 500 mg at 06/09/19 0510   • enoxaparin (LOVENOX) inj 40 mg 40 mg at 06/09/19 0510   • acetaminophen (TYLENOL) tablet 650 mg 650 mg at 06/08/19 1616   • cefTRIAXone (ROCEPHIN) 1 g in  mL IVPB Stopped at 06/08/19 1904     Medical Decision Making, by Problem:  Active Hospital Problems    Diagnosis   • *UTI (urinary tract infection) [N39.0]   • Iron deficiency anemia [D50.9]   • Dementia [F03.90]   • Diarrhea [R19.7]   • Hypokalemia [E87.6]   • Closed anterior dislocation of right hip (HCC) [S73.034A]   • GERD (gastroesophageal reflux disease) [K21.9]        ASSESSMENT:  1.  Symptomatic urinary tract infection - e.coli  2.  Recent right hip dislocation.  3.  Dementia.  4.  Rectal prolapse.  5.  Osteoarthritis.  6.  Cipro and Bactrim allergy - rash    Recommendations  - increase ceftriaxone to 2gm while inpatient  - upon discharge may transition to PO cefdinir 300mg PO BID to complete a 7 day antibiotic course. Target date 6/12/19  - OK for discharge once home arrangements with home health made. Otherwise stable for discharge  - FU with [facility] as needed    35 min spent in direct care of patient  Thank you for this consult. Discussed plan with Neo GAFFNEY, and Dr Ashby.

## 2019-06-09 NOTE — PROGRESS NOTES
Pt's BP raised up to 165/83. Paged MD for orders. MD aware of consistently high blood pressures. MD orders are to notify when SBP is over 170.

## 2019-06-09 NOTE — DISCHARGE PLANNING
Received Choice form at 1820  Agency/Facility Name: [facility]  Referral sent per Choice form @ 1820

## 2019-06-09 NOTE — PROGRESS NOTES
Pt showered prior to discharge. Discharged into care of family. Discussed discharge meds, activity and follow up. Escorted to private transportation by staff.

## 2019-06-09 NOTE — PROGRESS NOTES
Pt resting in best comfortably. Pt's BP has fallen to 155/81. Other vitals are stable. Pt is A&Ox1. No pain or n/v at this time. Fall precautions in place.

## 2019-06-09 NOTE — DISCHARGE INSTRUCTIONS
Discharge Instructions    Discharged to home by car with relative. Discharged via wheelchair, hospital escort: Yes.  Special equipment needed: Not Applicable    Be sure to schedule a follow-up appointment with your primary care doctor or any specialists as instructed.     Discharge Plan:   Diet Plan: Discussed  Activity Level: Discussed  Confirmed Follow up Appointment: Appointment Scheduled  Confirmed Symptoms Management: Discussed  Medication Reconciliation Updated: Yes  Pneumococcal Vaccine Administered/Refused: Not given - Patient refused pneumococcal vaccine  Influenza Vaccine Indication: Not indicated: Previously immunized this influenza season and > 8 years of age    I understand that a diet low in cholesterol, fat, and sodium is recommended for good health. Unless I have been given specific instructions below for another diet, I accept this instruction as my diet prescription.   Other diet: regular diet      Urinary Tract Infection, Adult  Introduction  A urinary tract infection (UTI) is an infection of any part of the urinary tract. The urinary tract includes the:  · Kidneys.  · Ureters.  · Bladder.  · Urethra.  These organs make, store, and get rid of pee (urine) in the body.  Follow these instructions at home:  · Take over-the-counter and prescription medicines only as told by your doctor.  · If you were prescribed an antibiotic medicine, take it as told by your doctor. Do not stop taking the antibiotic even if you start to feel better.  · Avoid the following drinks:  ¨ Alcohol.  ¨ Caffeine.  ¨ Tea.  ¨ Carbonated drinks.  · Drink enough fluid to keep your pee clear or pale yellow.  · Keep all follow-up visits as told by your doctor. This is important.  · Make sure to:  ¨ Empty your bladder often and completely. Do not to hold pee for long periods of time.  ¨ Empty your bladder before and after sex.  ¨ Wipe from front to back after a bowel movement if you are female. Use each tissue one time when you  wipe.  Contact a doctor if:  · You have back pain.  · You have a fever.  · You feel sick to your stomach (nauseous).  · You throw up (vomit).  · Your symptoms do not get better after 3 days.  · Your symptoms go away and then come back.  Get help right away if:  · You have very bad back pain.  · You have very bad lower belly (abdominal) pain.  · You are throwing up and cannot keep down any medicines or water.  This information is not intended to replace advice given to you by your health care provider. Make sure you discuss any questions you have with your health care provider.  Document Released: 06/05/2009 Document Revised: 05/25/2017 Document Reviewed: 11/07/2016  © 2017 Elsevier      Special Instructions: Cefdinir capsules  What is this medicine?  CEFDINIR (SEF di ner) is a cephalosporin antibiotic. It is used to treat certain kinds of bacterial infections. It will not work for colds, flu, or other viral infections.  This medicine may be used for other purposes; ask your health care provider or pharmacist if you have questions.  COMMON BRAND NAME(S): Omnicef  What should I tell my health care provider before I take this medicine?  They need to know if you have any of these conditions:  -bleeding problems  -kidney disease  -stomach or intestine problems (especially colitis)  -an unusual or allergic reaction to cefdinir, other cephalosporin antibiotics, penicillin, penicillamine, other foods, dyes or preservatives  -pregnant or trying to get pregnant  -breast-feeding  How should I use this medicine?  Take this medicine by mouth. Swallow it with a drink of water. Follow the directions on the prescription label. You can take it with or without food. If it upsets your stomach it may help to take it with food. Take your doses at regular intervals. Do not take it more often than directed. Finish all the medicine you are prescribed even if you think your infection is better.  Talk to your pediatrician regarding the use  of this medicine in children. Special care may be needed.  Overdosage: If you think you have taken too much of this medicine contact a poison control center or emergency room at once.  NOTE: This medicine is only for you. Do not share this medicine with others.  What if I miss a dose?  If you miss a dose, take it as soon as you can. If it is almost time for your next dose, take only that dose. Do not take double or extra doses.  What may interact with this medicine?  -antacids that contain aluminum or magnesium  -iron supplements  -other antibiotics  -probenecid  This list may not describe all possible interactions. Give your health care provider a list of all the medicines, herbs, non-prescription drugs, or dietary supplements you use. Also tell them if you smoke, drink alcohol, or use illegal drugs. Some items may interact with your medicine.  What should I watch for while using this medicine?  Tell your doctor or health care professional if your symptoms do not get better in a few days.  If you are diabetic you may get a false-positive result for sugar in your urine. Check with your doctor or health care professional before you change your diet or the dose of your diabetes medicine.  What side effects may I notice from receiving this medicine?  Side effects that you should report to your doctor or health care professional as soon as possible:  -allergic reactions like skin rash, itching or hives, swelling of the face, lips, or tongue  -bloody or watery diarrhea  -breathing problems  -fever  -redness, blistering, peeling or loosening of the skin, including inside the mouth  -seizures  -trouble passing urine or change in the amount of urine  -unusual bleeding or bruising  -unusually weak or tired  Side effects that usually do not require medical attention (report to your doctor or health care professional if they continue or are bothersome):  -constipation  -diarrhea  -dizziness  -dry mouth  -headache  -loss of  appetite  -nausea, vomiting  -stomach pain  -stool discoloration  -tiredness  -vaginal discharge, itching, or odor in women  This list may not describe all possible side effects. Call your doctor for medical advice about side effects. You may report side effects to FDA at 8-693-BEH-5780.  Where should I keep my medicine?  Keep out of the reach of children.  Store at room temperature between 15 and 30 degrees C (59 and 86 degrees F). Throw the medicine away after the expiration date.  NOTE: This sheet is a summary. It may not cover all possible information. If you have questions about this medicine, talk to your doctor, pharmacist, or health care provider.  © 2018 Elsevier/Gold Standard (2017-04-24 15:52:44)        · Is patient discharged on Warfarin / Coumadin?   No     Depression / Suicide Risk    As you are discharged from this [facility] facility, it is important to learn how to keep safe from harming yourself.    Recognize the warning signs:  · Abrupt changes in personality, positive or negative- including increase in energy   · Giving away possessions  · Change in eating patterns- significant weight changes-  positive or negative  · Change in sleeping patterns- unable to sleep or sleeping all the time   · Unwillingness or inability to communicate  · Depression  · Unusual sadness, discouragement and loneliness  · Talk of wanting to die  · Neglect of personal appearance   · Rebelliousness- reckless behavior  · Withdrawal from people/activities they love  · Confusion- inability to concentrate     If you or a loved one observes any of these behaviors or has concerns about self-harm, here's what you can do:  · Talk about it- your feelings and reasons for harming yourself  · Remove any means that you might use to hurt yourself (examples: pills, rope, extension cords, firearm)  · Get professional help from the community (Mental Health, Substance Abuse, psychological counseling)  · Do not be alone:Call your Safe  Contact- someone whom you trust who will be there for you.  · Call your local CRISIS HOTLINE 896-6336 or 385-919-3191  · Call your local Children's Mobile Crisis Response Team [place] (134) 309-5274 or www.[facility]  · Call the toll free National Suicide Prevention Hotlines   · National Suicide Prevention Lifeline 567-092-ECBR (2512)  · National mySBX Line Network 800-SUICIDE (517-5356)

## 2019-06-09 NOTE — DISCHARGE PLANNING
Care Transition Team Assessment    Anticipated Discharge Disposition: Home w/ HH    Action: SHAHIDA spoke to pt's dtrEvelyn, pt has had HH in the past.  Pt was on service with [facility].  SHAHIDA faxed choice to [facility].    Evelyn is aware the HH won't be verified until Monday.     Barriers to Discharge: HH acceptance    Plan: F/U on referral    Information Source  Orientation : Disoriented to Event, Disoriented to Time, Disoriented to Place (knows first name, but not last name)  Information Given By: Patient  Who is responsible for making decisions for patient? : Adult child  Name(s) of Primary Decision Maker: vEelyn    Elopement Risk  Legal Hold: No  Ambulatory or Self Mobile in Wheelchair: No-Not an Elopement Risk  Elopement Risk: Not at Risk for Elopement    Interdisciplinary Discharge Planning  Does Admitting Nurse Feel This Could be a Complex Discharge?: No  Lives with - Patient's Self Care Capacity: Attendant / Paid Care Giver, Adult Children  Patient or legal guardian wants to designate a caregiver (see row info): Yes  Caregiver name: Evelyn  Caregiver relationship to patient: daughter  Support Systems: Family Member(s), Friends / Neighbors  Housing / Facility: Unable To Determine At This Time  Do You Take your Prescribed Medications Regularly: Yes  Able to Return to Previous ADL's: Yes  Mobility Issues: Yes  Prior Services: Unable To Determine At This Time  Assistance Needed: No  Durable Medical Equipment: Not Applicable    Discharge Preparedness  What is your plan after discharge?: Home with help, Home health care  What are your discharge supports?: Child    Finances  Financial Barriers to Discharge: No  Prescription Coverage: Yes    Vision / Hearing Impairment  Vision Impairment : No  Hearing Impairment : Yes  Hearing Impairment: Both Ears, Hearing Device Not Available  Does Pt Need Special Equipment for the Hearing Impaired?: No    Domestic Abuse  Have you ever been the victim of abuse or violence?: No  Physical  Abuse or Sexual Abuse: No  Verbal Abuse or Emotional Abuse: No  Possible Abuse Reported to:: Not Applicable    Discharge Risks or Barriers  Discharge risks or barriers?: No    Anticipated Discharge Information  Anticipated discharge disposition: [facility], Home

## 2019-06-09 NOTE — PROGRESS NOTES
Pt resting in bed. VSS. Refusing breakfast. Will reoffer. Offered to call kitchen for alternative, however still refuses. Will monitor. Inc of urine. Pads changed and pt positioned supine per her request. mepelix placed on buttocks. Assisted with TV. Will monitor.

## 2019-06-10 NOTE — DISCHARGE SUMMARY
Discharge Summary    CHIEF COMPLAINT ON ADMISSION  Chief Complaint   Patient presents with   • Diarrhea   • Other     cloudy urine       Reason for Admission  Dementia     Admission Date  6/6/2019    CODE STATUS  DNR/DNI    HPI & HOSPITAL COURSE  This is a 89 y.o. female with a History of dementia and frequent UTIs admitted with foul-smelling urine found to have recurrent UTI.  She was placed on empiric antibiotic therapy with ceftriaxone and had improvement of her symptoms.  Infectious disease has previously followed the patient therefore they were consulted.  Her sensitivities returned E. coli sensitive to ceftriaxone therefore she was transitioned over to oral Omnicef to complete 5 additional days after discharge.      The patient did suffer with intermittent nausea which was well controlled with Zofran.  She initially had hypokalemia which was addressed with supplementation.    She was noted to have iron deficiency anemia therefore was started on oral replacement and should have hemoglobin levels monitored occasionally as an outpatient to ensure they remain stable.    Therefore, she is discharged in fair and stable condition to home with organized home healthcare and close outpatient follow-up.    The patient met 2-midnight criteria for an inpatient stay at the time of discharge.    Discharge Date  6/9/2019    FOLLOW UP ITEMS POST DISCHARGE  Follow-up with PCP as needed    DISCHARGE DIAGNOSES  Principal Problem:    UTI (urinary tract infection) POA: Unknown  Active Problems:    GERD (gastroesophageal reflux disease) (Chronic) POA: Yes    Dementia POA: Unknown    Diarrhea POA: Unknown    Hypokalemia POA: Unknown    Closed anterior dislocation of right hip (HCC) POA: Unknown    Iron deficiency anemia POA: Unknown  Resolved Problems:    * No resolved hospital problems. *      FOLLOW UP  No future appointments.  Brad Garcia M.D.  [id number] [facility] [address] [id number] [phone]    In 1  week        MEDICATIONS ON DISCHARGE     Medication List      START taking these medications      Instructions   cefdinir 300 MG Caps  Commonly known as:  OMNICEF   Take 1 Cap by mouth 2 times a day for 4 days.  Dose:  300 mg     ferrous sulfate 325 (65 Fe) MG tablet   Take 1 Tab by mouth every morning with breakfast.  Dose:  325 mg     ondansetron 4 MG/5ML oral solution  Commonly known as:  ZOFRAN   Take 5 mL by mouth every 6 hours as needed for Nausea.  Dose:  4 mg        CONTINUE taking these medications      Instructions   ascorbic acid 500 MG Tabs  Commonly known as:  ascorbic acid   Take 500 mg by mouth every day.  Dose:  500 mg     aspirin EC 81 MG Tbec  Commonly known as:  ECOTRIN   Take 81 mg by mouth every 48 hours.  Dose:  81 mg     CENTRUM SILVER ADULT 50+ PO   Take 1 Tab by mouth every day.  Dose:  1 Tab     DULCOLAX 100 MG Caps  Generic drug:  docusate sodium   Take 100 mg by mouth every day.  Dose:  100 mg     FIBER PO   Take 1 Cap by mouth every day.  Dose:  1 Cap     MAGNESIUM PO   Take 1 Cap by mouth every day.  Dose:  1 Cap     omeprazole 20 MG delayed-release capsule  Commonly known as:  PRILOSEC   Take 20 mg by mouth every day.  Dose:  20 mg     PROBIOTIC DAILY PO   Take 1 Cap by mouth every day.  Dose:  1 Cap     ULTRAM 50 MG Tabs  Generic drug:  tramadol   Take 50 mg by mouth every 6 hours as needed for Moderate Pain.  Dose:  50 mg     VITAMIN D (CHOLECALCIFEROL) PO   Take 1 Cap by mouth every day.  Dose:  1 Cap        STOP taking these medications    ALEVE 220 MG tablet  Generic drug:  naproxen            Allergies  Allergies   Allergen Reactions   • Ciprofloxacin Rash     Pts daughter (Evelyn) reports rash all over body.   • Sulfamethoxazole-Trimethoprim Rash     Pts daughter (Evelyn) reports rash all over body.       DIET  No orders of the defined types were placed in this encounter.      ACTIVITY  As tolerated.  Weight bearing as tolerated    CONSULTATIONS  Haleigh Harrison infectious  disease    PROCEDURES  None    LABORATORY  Lab Results   Component Value Date    SODIUM 134 (L) 06/09/2019    POTASSIUM 3.2 (L) 06/09/2019    CHLORIDE 100 06/09/2019    CO2 22 06/09/2019    GLUCOSE 114 (H) 06/09/2019    BUN 8 06/09/2019    CREATININE 0.65 06/09/2019    CREATININE 0.7 12/10/2007        Lab Results   Component Value Date    WBC 5.4 06/08/2019    HEMOGLOBIN 9.7 (L) 06/08/2019    HEMATOCRIT 30.6 (L) 06/08/2019    PLATELETCT 312 06/08/2019        Total time of the discharge process exceeds 37 minutes.

## 2019-06-11 LAB
BACTERIA BLD CULT: NORMAL
BACTERIA BLD CULT: NORMAL
SIGNIFICANT IND 70042: NORMAL
SIGNIFICANT IND 70042: NORMAL
SITE SITE: NORMAL
SITE SITE: NORMAL
SOURCE SOURCE: NORMAL
SOURCE SOURCE: NORMAL

## 2021-01-14 DIAGNOSIS — Z23 NEED FOR VACCINATION: ICD-10-CM

## (undated) DEVICE — SET EXTENSION WITH 2 PORTS (48EA/CA) ***PART #2C8610 IS A SUBSTITUTE*****

## (undated) DEVICE — SENSOR SPO2 NEO LNCS ADHESIVE (20/BX) SEE USER NOTES

## (undated) DEVICE — SET LEADWIRE 5 LEAD BEDSIDE DISPOSABLE ECG (1SET OF 5/EA)

## (undated) DEVICE — SUCTION INSTRUMENT YANKAUER BULBOUS TIP W/O VENT (50EA/CA)

## (undated) DEVICE — KIT ROOM DECONTAMINATION

## (undated) DEVICE — CANISTER SUCTION 3000ML MECHANICAL FILTER AUTO SHUTOFF MEDI-VAC NONSTERILE LF DISP  (40EA/CA)

## (undated) DEVICE — HEAD HOLDER JUNIOR/ADULT

## (undated) DEVICE — KIT ANESTHESIA W/CIRCUIT & 3/LT BAG W/FILTER (20EA/CA)

## (undated) DEVICE — NEPTUNE 4 PORT MANIFOLD - (20/PK)

## (undated) DEVICE — MASK ANESTHESIA ADULT  - (100/CA)

## (undated) DEVICE — PROTECTOR ULNA NERVE - (36PR/CA)

## (undated) DEVICE — LACTATED RINGERS INJ 1000 ML - (14EA/CA 60CA/PF)